# Patient Record
Sex: FEMALE | Race: WHITE | NOT HISPANIC OR LATINO | Employment: UNEMPLOYED | URBAN - METROPOLITAN AREA
[De-identification: names, ages, dates, MRNs, and addresses within clinical notes are randomized per-mention and may not be internally consistent; named-entity substitution may affect disease eponyms.]

---

## 2017-02-13 ENCOUNTER — TRANSCRIBE ORDERS (OUTPATIENT)
Dept: ADMINISTRATIVE | Facility: HOSPITAL | Age: 37
End: 2017-02-13

## 2017-02-13 ENCOUNTER — ALLSCRIPTS OFFICE VISIT (OUTPATIENT)
Dept: OTHER | Facility: OTHER | Age: 37
End: 2017-02-13

## 2017-02-13 DIAGNOSIS — I10 ESSENTIAL (PRIMARY) HYPERTENSION: ICD-10-CM

## 2017-02-13 DIAGNOSIS — R06.02 SHORTNESS OF BREATH: ICD-10-CM

## 2017-02-13 DIAGNOSIS — R06.02 SHORTNESS OF BREATH: Primary | ICD-10-CM

## 2017-02-13 DIAGNOSIS — R07.89 OTHER CHEST PAIN: ICD-10-CM

## 2017-02-27 ENCOUNTER — HOSPITAL ENCOUNTER (OUTPATIENT)
Dept: NON INVASIVE DIAGNOSTICS | Facility: CLINIC | Age: 37
Discharge: HOME | End: 2017-02-27
Payer: COMMERCIAL

## 2017-02-27 DIAGNOSIS — R06.02 SHORTNESS OF BREATH: ICD-10-CM

## 2017-02-27 DIAGNOSIS — R07.89 OTHER CHEST PAIN: ICD-10-CM

## 2017-02-27 DIAGNOSIS — I10 ESSENTIAL (PRIMARY) HYPERTENSION: ICD-10-CM

## 2017-02-27 LAB
CHEST PAIN STATEMENT: NORMAL
MAX DIASTOLIC BP: 92 MMHG
MAX HEART RATE: 169 BPM
MAX PREDICTED HEART RATE: 185 BPM
MAX. SYSTOLIC BP: 148 MMHG
PROTOCOL NAME: NORMAL
REASON FOR TERMINATION: NORMAL
TARGET HR FORMULA: NORMAL
TEST INDICATION: NORMAL
TIME IN EXERCISE PHASE: 586 S

## 2017-02-27 PROCEDURE — 93017 CV STRESS TEST TRACING ONLY: CPT

## 2017-02-27 PROCEDURE — 93306 TTE W/DOPPLER COMPLETE: CPT

## 2017-03-27 ENCOUNTER — ALLSCRIPTS OFFICE VISIT (OUTPATIENT)
Dept: OTHER | Facility: OTHER | Age: 37
End: 2017-03-27

## 2017-09-18 ENCOUNTER — ALLSCRIPTS OFFICE VISIT (OUTPATIENT)
Dept: OTHER | Facility: OTHER | Age: 37
End: 2017-09-18

## 2018-01-12 VITALS
SYSTOLIC BLOOD PRESSURE: 140 MMHG | HEART RATE: 72 BPM | HEIGHT: 62 IN | WEIGHT: 170.5 LBS | BODY MASS INDEX: 31.38 KG/M2 | DIASTOLIC BLOOD PRESSURE: 90 MMHG

## 2018-01-13 VITALS
BODY MASS INDEX: 23.3 KG/M2 | HEIGHT: 72 IN | HEART RATE: 60 BPM | DIASTOLIC BLOOD PRESSURE: 80 MMHG | SYSTOLIC BLOOD PRESSURE: 120 MMHG | WEIGHT: 172 LBS

## 2018-01-13 VITALS
SYSTOLIC BLOOD PRESSURE: 126 MMHG | BODY MASS INDEX: 31.47 KG/M2 | HEART RATE: 81 BPM | DIASTOLIC BLOOD PRESSURE: 80 MMHG | HEIGHT: 62 IN | WEIGHT: 171 LBS

## 2018-04-01 DIAGNOSIS — I10 ESSENTIAL HYPERTENSION: Primary | ICD-10-CM

## 2018-04-02 RX ORDER — LISINOPRIL 5 MG/1
TABLET ORAL
Qty: 30 TABLET | Refills: 5 | Status: SHIPPED | OUTPATIENT
Start: 2018-04-02 | End: 2018-09-26 | Stop reason: SDUPTHER

## 2018-09-26 DIAGNOSIS — I10 ESSENTIAL HYPERTENSION: ICD-10-CM

## 2018-09-26 RX ORDER — LISINOPRIL 5 MG/1
5 TABLET ORAL DAILY
Qty: 90 TABLET | Refills: 3 | Status: SHIPPED | OUTPATIENT
Start: 2018-09-26 | End: 2019-09-25 | Stop reason: DRUGHIGH

## 2019-09-25 ENCOUNTER — TELEPHONE (OUTPATIENT)
Dept: CARDIOLOGY CLINIC | Facility: CLINIC | Age: 39
End: 2019-09-25

## 2019-09-25 RX ORDER — LISINOPRIL 10 MG/1
10 TABLET ORAL DAILY
COMMUNITY
Start: 2019-09-25 | End: 2021-12-15

## 2019-09-25 NOTE — TELEPHONE ENCOUNTER
Pt advised  Offered appt with Ap for tomorrow  Pt refuse   She states she will call back to reschedule

## 2019-09-25 NOTE — TELEPHONE ENCOUNTER
Increase lisinopril to 10 mg daily  I am at [facility] tomorrow and completely booked, so if she wants she can see one of APs

## 2019-09-25 NOTE — TELEPHONE ENCOUNTER
Pt called stating her job is giving her a hard time  She is unable to leave to come to her appt on 9/27  Pt states she is available anytime before 11am tomorrow or she needs reschedule for a later appt but her BP is really elevated  Advised pt she really needs to get in  She denies chest pain, SOB, LH  She is currently being treated for bronchitis, prednisone and z-mariel  Pt states she willing to go to any office  I can see if one of the AP will see her if that ok with you?     Today 179/90  tues 1678/92  mon 153/94    lisinpril 5mg daily

## 2020-04-01 ENCOUNTER — OFFICE VISIT (OUTPATIENT)
Dept: FAMILY MEDICINE CLINIC | Facility: CLINIC | Age: 40
End: 2020-04-01
Payer: COMMERCIAL

## 2020-04-01 VITALS
WEIGHT: 175.2 LBS | RESPIRATION RATE: 16 BRPM | DIASTOLIC BLOOD PRESSURE: 72 MMHG | OXYGEN SATURATION: 98 % | BODY MASS INDEX: 33.08 KG/M2 | HEART RATE: 84 BPM | SYSTOLIC BLOOD PRESSURE: 120 MMHG | TEMPERATURE: 98.8 F | HEIGHT: 61 IN

## 2020-04-01 DIAGNOSIS — H60.322 ACUTE HEMORRHAGIC OTITIS EXTERNA OF LEFT EAR: Primary | ICD-10-CM

## 2020-04-01 PROCEDURE — 3078F DIAST BP <80 MM HG: CPT | Performed by: FAMILY MEDICINE

## 2020-04-01 PROCEDURE — 3008F BODY MASS INDEX DOCD: CPT | Performed by: FAMILY MEDICINE

## 2020-04-01 PROCEDURE — 1036F TOBACCO NON-USER: CPT | Performed by: FAMILY MEDICINE

## 2020-04-01 PROCEDURE — 3074F SYST BP LT 130 MM HG: CPT | Performed by: FAMILY MEDICINE

## 2020-04-01 PROCEDURE — 99213 OFFICE O/P EST LOW 20 MIN: CPT | Performed by: FAMILY MEDICINE

## 2020-04-05 ENCOUNTER — HOSPITAL ENCOUNTER (EMERGENCY)
Facility: HOSPITAL | Age: 40
Discharge: HOME | End: 2020-04-05
Attending: EMERGENCY MEDICINE | Admitting: EMERGENCY MEDICINE
Payer: COMMERCIAL

## 2020-04-05 VITALS
OXYGEN SATURATION: 96 % | DIASTOLIC BLOOD PRESSURE: 74 MMHG | HEART RATE: 96 BPM | BODY MASS INDEX: 33.49 KG/M2 | SYSTOLIC BLOOD PRESSURE: 158 MMHG | TEMPERATURE: 98 F | RESPIRATION RATE: 18 BRPM | WEIGHT: 177.25 LBS

## 2020-04-05 DIAGNOSIS — T23.292A SECOND DEGREE BURN OF MULTIPLE SITES OF LEFT HAND AND WRIST, INITIAL ENCOUNTER: Primary | ICD-10-CM

## 2020-04-05 PROCEDURE — 99284 EMERGENCY DEPT VISIT MOD MDM: CPT | Performed by: EMERGENCY MEDICINE

## 2020-04-05 PROCEDURE — 99283 EMERGENCY DEPT VISIT LOW MDM: CPT

## 2020-04-05 PROCEDURE — 90715 TDAP VACCINE 7 YRS/> IM: CPT | Performed by: EMERGENCY MEDICINE

## 2020-04-05 PROCEDURE — 90471 IMMUNIZATION ADMIN: CPT

## 2020-04-05 RX ORDER — GINSENG 100 MG
1 CAPSULE ORAL ONCE
Status: COMPLETED | OUTPATIENT
Start: 2020-04-05 | End: 2020-04-05

## 2020-04-05 RX ORDER — MORPHINE SULFATE 15 MG/1
7.5 TABLET ORAL EVERY 4 HOURS PRN
Qty: 11 TABLET | Refills: 0 | Status: SHIPPED | OUTPATIENT
Start: 2020-04-05 | End: 2020-04-15

## 2020-04-05 RX ORDER — ACETAMINOPHEN 500 MG
1000 TABLET ORAL EVERY 6 HOURS PRN
Qty: 30 TABLET | Refills: 0 | Status: SHIPPED | OUTPATIENT
Start: 2020-04-05 | End: 2021-12-16 | Stop reason: ALTCHOICE

## 2020-04-05 RX ORDER — OXYCODONE HYDROCHLORIDE 5 MG/1
5 TABLET ORAL ONCE
Status: COMPLETED | OUTPATIENT
Start: 2020-04-05 | End: 2020-04-05

## 2020-04-05 RX ADMIN — OXYCODONE HYDROCHLORIDE 5 MG: 5 TABLET ORAL at 14:54

## 2020-04-05 RX ADMIN — BACITRACIN ZINC 1 LARGE APPLICATION: 500 OINTMENT TOPICAL at 14:56

## 2020-04-05 RX ADMIN — TETANUS TOXOID, REDUCED DIPHTHERIA TOXOID AND ACELLULAR PERTUSSIS VACCINE, ADSORBED 0.5 ML: 5; 2.5; 8; 8; 2.5 SUSPENSION INTRAMUSCULAR at 14:55

## 2020-12-22 DIAGNOSIS — I10 ESSENTIAL (PRIMARY) HYPERTENSION: ICD-10-CM

## 2020-12-23 RX ORDER — LISINOPRIL 5 MG/1
TABLET ORAL
Qty: 21 TABLET | Refills: 8 | OUTPATIENT
Start: 2020-12-23

## 2021-01-08 DIAGNOSIS — I10 ESSENTIAL (PRIMARY) HYPERTENSION: Primary | ICD-10-CM

## 2021-01-08 RX ORDER — LISINOPRIL 5 MG/1
TABLET ORAL
Qty: 21 TABLET | Refills: 8 | Status: SHIPPED | OUTPATIENT
Start: 2021-01-08 | End: 2021-12-14 | Stop reason: SDUPTHER

## 2021-12-14 DIAGNOSIS — I10 ESSENTIAL (PRIMARY) HYPERTENSION: ICD-10-CM

## 2021-12-14 RX ORDER — LISINOPRIL 5 MG/1
5 TABLET ORAL DAILY
Qty: 7 TABLET | Refills: 0 | Status: SHIPPED | OUTPATIENT
Start: 2021-12-14 | End: 2021-12-16

## 2021-12-16 ENCOUNTER — OFFICE VISIT (OUTPATIENT)
Dept: FAMILY MEDICINE CLINIC | Facility: CLINIC | Age: 41
End: 2021-12-16
Payer: COMMERCIAL

## 2021-12-16 VITALS
SYSTOLIC BLOOD PRESSURE: 150 MMHG | WEIGHT: 150 LBS | HEIGHT: 62 IN | OXYGEN SATURATION: 98 % | BODY MASS INDEX: 27.6 KG/M2 | RESPIRATION RATE: 16 BRPM | DIASTOLIC BLOOD PRESSURE: 98 MMHG | HEART RATE: 88 BPM | TEMPERATURE: 99.3 F

## 2021-12-16 DIAGNOSIS — Z12.31 SCREENING MAMMOGRAM FOR HIGH-RISK PATIENT: ICD-10-CM

## 2021-12-16 DIAGNOSIS — Z11.59 NEED FOR HEPATITIS C SCREENING TEST: ICD-10-CM

## 2021-12-16 DIAGNOSIS — Z13.220 SCREENING CHOLESTEROL LEVEL: ICD-10-CM

## 2021-12-16 DIAGNOSIS — Z00.00 WELL ADULT EXAM: ICD-10-CM

## 2021-12-16 DIAGNOSIS — I10 ESSENTIAL (PRIMARY) HYPERTENSION: Primary | ICD-10-CM

## 2021-12-16 DIAGNOSIS — Z11.4 SCREENING FOR HIV (HUMAN IMMUNODEFICIENCY VIRUS): ICD-10-CM

## 2021-12-16 DIAGNOSIS — R21 MALAR RASH: ICD-10-CM

## 2021-12-16 PROCEDURE — 99396 PREV VISIT EST AGE 40-64: CPT | Performed by: FAMILY MEDICINE

## 2021-12-16 PROCEDURE — 3008F BODY MASS INDEX DOCD: CPT | Performed by: FAMILY MEDICINE

## 2021-12-16 PROCEDURE — 3725F SCREEN DEPRESSION PERFORMED: CPT | Performed by: FAMILY MEDICINE

## 2021-12-16 PROCEDURE — 1036F TOBACCO NON-USER: CPT | Performed by: FAMILY MEDICINE

## 2021-12-16 RX ORDER — LISINOPRIL 5 MG/1
5 TABLET ORAL DAILY
Qty: 30 TABLET | Refills: 1 | Status: SHIPPED | OUTPATIENT
Start: 2021-12-16 | End: 2021-12-16

## 2021-12-16 RX ORDER — LISINOPRIL 5 MG/1
10 TABLET ORAL DAILY
Qty: 30 TABLET | Refills: 1 | Status: SHIPPED | OUTPATIENT
Start: 2021-12-16 | End: 2022-01-17

## 2022-01-16 NOTE — PROGRESS NOTES
BMI Counseling: There is no height or weight on file to calculate BMI  The BMI is above normal  Nutrition recommendations include decreasing portion sizes, encouraging healthy choices of fruits and vegetables, decreasing fast food intake, consuming healthier snacks, limiting drinks that contain sugar, moderation in carbohydrate intake, increasing intake of lean protein, reducing intake of saturated and trans fat and reducing intake of cholesterol  Exercise recommendations include exercising 3-5 times per week  No pharmacotherapy was ordered  Patient referred to PCP  Rationale for BMI follow-up plan is due to patient being overweight or obese  Assessment/Plan:         Problem List Items Addressed This Visit        Cardiovascular and Mediastinum    Hypertension - Primary     Patient is stable with current anti-hypertensive medicine and continue to follow a low sodium diet and take current medication  All questions about this condition were answered today  Relevant Medications    lisinopril (ZESTRIL) 10 mg tablet      Other Visit Diagnoses     Malar rash        Relevant Orders    Ambulatory Referral to Rheumatology    Essential (primary) hypertension        Relevant Medications    lisinopril (ZESTRIL) 10 mg tablet            Subjective:      Patient ID: Jazmyne Givens is a 37 y o  female  A 72-year-old female here today for checkup on multiple medical problems patient has a hypertension is doing well with that  Patient on 10 mg and blood pressure stable  Patient episode of low pressure at 1 episode but she has only had that that were aware  Patient also has a malar rash and her lab work was reviewed which showed she was positive for lupus screen  Patient will see a rheumatologist for further evaluation of possible lupus        The following portions of the patient's history were reviewed and updated as appropriate:   Past Medical History:  She has a past medical history of Allergic, Anxiety, GERD (gastroesophageal reflux disease), Hypertension, Ovarian cyst, Sinus headache, and Sleep disorder  ,  _______________________________________________________________________  Medical Problems:  does not have any pertinent problems on file ,  _______________________________________________________________________  Past Surgical History:   has a past surgical history that includes [place] tooth extraction (2012); Ankle surgery (Right); Oophorectomy (Right); Tubal ligation; Mole removal; Ovarian cyst removal; and Wrist surgery (2013)  ,  _______________________________________________________________________  Family History:  family history includes No Known Problems in her father and mother ,  _______________________________________________________________________  Social History:   reports that she has never smoked  She has never used smokeless tobacco  She reports current alcohol use  She reports that she does not use drugs  ,  _______________________________________________________________________  Allergies:  is allergic to levofloxacin     _______________________________________________________________________  Current Outpatient Medications   Medication Sig Dispense Refill    lisinopril (ZESTRIL) 10 mg tablet Take 1 tablet (10 mg total) by mouth daily 90 tablet 1     No current facility-administered medications for this visit      _______________________________________________________________________  Review of Systems      Objective:  Vitals:    01/17/22 1654   BP: 130/70   BP Location: Left arm   Patient Position: Sitting   Cuff Size: Standard   Pulse: 97   Resp: 16   Temp: 99 3 °F (37 4 °C)   SpO2: 98%   Weight: 67 6 kg (149 lb)   Height: 5' 2" (1 575 m)     Body mass index is 27 25 kg/m²       Physical Exam

## 2022-01-17 ENCOUNTER — OFFICE VISIT (OUTPATIENT)
Dept: FAMILY MEDICINE CLINIC | Facility: CLINIC | Age: 42
End: 2022-01-17
Payer: COMMERCIAL

## 2022-01-17 VITALS
HEART RATE: 97 BPM | HEIGHT: 62 IN | RESPIRATION RATE: 16 BRPM | TEMPERATURE: 99.3 F | SYSTOLIC BLOOD PRESSURE: 130 MMHG | DIASTOLIC BLOOD PRESSURE: 70 MMHG | BODY MASS INDEX: 27.42 KG/M2 | WEIGHT: 149 LBS | OXYGEN SATURATION: 98 %

## 2022-01-17 DIAGNOSIS — I10 ESSENTIAL (PRIMARY) HYPERTENSION: ICD-10-CM

## 2022-01-17 DIAGNOSIS — I10 PRIMARY HYPERTENSION: Primary | ICD-10-CM

## 2022-01-17 DIAGNOSIS — R21 MALAR RASH: ICD-10-CM

## 2022-01-17 PROCEDURE — 3078F DIAST BP <80 MM HG: CPT | Performed by: FAMILY MEDICINE

## 2022-01-17 PROCEDURE — 1036F TOBACCO NON-USER: CPT | Performed by: FAMILY MEDICINE

## 2022-01-17 PROCEDURE — 3008F BODY MASS INDEX DOCD: CPT | Performed by: FAMILY MEDICINE

## 2022-01-17 PROCEDURE — 3075F SYST BP GE 130 - 139MM HG: CPT | Performed by: FAMILY MEDICINE

## 2022-01-17 PROCEDURE — 99213 OFFICE O/P EST LOW 20 MIN: CPT | Performed by: FAMILY MEDICINE

## 2022-01-17 RX ORDER — LISINOPRIL 10 MG/1
10 TABLET ORAL DAILY
Qty: 90 TABLET | Refills: 1 | Status: SHIPPED | OUTPATIENT
Start: 2022-01-17 | End: 2022-04-18 | Stop reason: SDUPTHER

## 2022-04-17 NOTE — PROGRESS NOTES
Depression Screening and Follow-up Plan: Patient was screened for depression during today's encounter  They screened negative with a PHQ-2 score of 0  Assessment/Plan:         Problem List Items Addressed This Visit        Cardiovascular and Mediastinum    Hypertension     Patient is stable with current anti-hypertensive medicine and continue to follow a low sodium diet and take current medication  All questions about this condition were answered today  Relevant Medications    lisinopril (ZESTRIL) 10 mg tablet    Other Relevant Orders    Basic metabolic panel      Other Visit Diagnoses     Encounter for screening mammogram for breast cancer    -  Primary    Relevant Orders    Mammo screening bilateral w 3d & cad    Essential (primary) hypertension        Relevant Medications    lisinopril (ZESTRIL) 10 mg tablet            Subjective:      Patient ID: Cristy Colbert is a 37 y o  female  A 27-year-old female here today for checkup on multiple medical problems  Patient with hypertension is doing very well with that  Patient also last time was seen her had a malar rash suggestive of lupus  Patient has seen Rheumatology who initially thought that she indeed had lupus however she went to a dermatologist for a 2nd opinion regards to possible rosacea which is with the dermatologist thought it was  Patient back to rheumatologist and had negative lab results for lupus and her rash is much better with the row fade  Will see patient back in approximately 6 months to reassess her blood pressure and will check her lab work then also  The following portions of the patient's history were reviewed and updated as appropriate:   Past Medical History:  She has a past medical history of Allergic, Anxiety, GERD (gastroesophageal reflux disease), Hypertension, Ovarian cyst, Sinus headache, and Sleep disorder  ,  _______________________________________________________________________  Medical Problems:  does not have any pertinent problems on file ,  _______________________________________________________________________  Past Surgical History:   has a past surgical history that includes [place] tooth extraction (2012); Ankle surgery (Right); Oophorectomy (Right); Tubal ligation; Mole removal; Ovarian cyst removal; and Wrist surgery (2013)  ,  _______________________________________________________________________  Family History:  family history includes No Known Problems in her father and mother ,  _______________________________________________________________________  Social History:   reports that she has never smoked  She has never used smokeless tobacco  She reports current alcohol use  She reports that she does not use drugs  ,  _______________________________________________________________________  Allergies:  is allergic to levofloxacin     _______________________________________________________________________  Current Outpatient Medications   Medication Sig Dispense Refill    lisinopril (ZESTRIL) 10 mg tablet Take 1 tablet (10 mg total) by mouth daily 90 tablet 1    Rhofade 1 % CREA apply to face once daily if needed with FLARES       No current facility-administered medications for this visit      _______________________________________________________________________  Review of Systems   Constitutional: Negative for activity change, appetite change, fatigue and fever  HENT: Negative for congestion, ear pain, postnasal drip, rhinorrhea, sinus pressure, sinus pain, sneezing and sore throat  Eyes: Negative for pain and redness  Respiratory: Negative for apnea, cough, chest tightness, shortness of breath and wheezing  Cardiovascular: Negative for chest pain, palpitations and leg swelling  Gastrointestinal: Negative for abdominal pain, constipation, diarrhea, nausea and vomiting  Endocrine: Negative for cold intolerance and heat intolerance     Genitourinary: Negative for difficulty urinating, dysuria, frequency, hematuria and urgency  Musculoskeletal: Negative for arthralgias, back pain, gait problem and myalgias  Skin: Positive for rash  Neurological: Negative for dizziness, speech difficulty, weakness, numbness and headaches  Hematological: Does not bruise/bleed easily  Psychiatric/Behavioral: Negative for agitation, confusion and hallucinations  Objective:  Vitals:    04/18/22 1329   BP: 126/74   BP Location: Left arm   Patient Position: Sitting   Cuff Size: Standard   Pulse: 71   Resp: 18   Temp: 98 2 °F (36 8 °C)   SpO2: 98%   Weight: 70 3 kg (155 lb)   Height: 5' 2" (1 575 m)     Body mass index is 28 35 kg/m²  Physical Exam  Vitals and nursing note reviewed  Constitutional:       Appearance: She is well-developed  HENT:      Head: Normocephalic and atraumatic  Nose: Nose normal       Mouth/Throat:      Mouth: Mucous membranes are moist    Eyes:      General: No scleral icterus  Conjunctiva/sclera: Conjunctivae normal       Pupils: Pupils are equal, round, and reactive to light  Neck:      Thyroid: No thyromegaly  Cardiovascular:      Rate and Rhythm: Normal rate and regular rhythm  Pulmonary:      Effort: Pulmonary effort is normal       Breath sounds: Normal breath sounds  No wheezing  Abdominal:      General: Bowel sounds are normal  There is no distension  Palpations: Abdomen is soft  Tenderness: There is no abdominal tenderness  There is no guarding or rebound  Musculoskeletal:         General: No tenderness or deformity  Normal range of motion  Cervical back: Normal range of motion and neck supple  Skin:     General: Skin is warm and dry  Findings: No erythema or rash  Neurological:      Mental Status: She is alert and oriented to person, place, and time  Sensory: No sensory deficit  Psychiatric:         Behavior: Behavior normal          Thought Content:  Thought content normal          Judgment: Judgment normal

## 2022-04-18 ENCOUNTER — OFFICE VISIT (OUTPATIENT)
Dept: FAMILY MEDICINE CLINIC | Facility: CLINIC | Age: 42
End: 2022-04-18
Payer: COMMERCIAL

## 2022-04-18 VITALS
SYSTOLIC BLOOD PRESSURE: 126 MMHG | BODY MASS INDEX: 28.52 KG/M2 | DIASTOLIC BLOOD PRESSURE: 74 MMHG | HEART RATE: 71 BPM | WEIGHT: 155 LBS | RESPIRATION RATE: 18 BRPM | TEMPERATURE: 98.2 F | HEIGHT: 62 IN | OXYGEN SATURATION: 98 %

## 2022-04-18 DIAGNOSIS — Z12.31 ENCOUNTER FOR SCREENING MAMMOGRAM FOR BREAST CANCER: Primary | ICD-10-CM

## 2022-04-18 DIAGNOSIS — I10 PRIMARY HYPERTENSION: ICD-10-CM

## 2022-04-18 DIAGNOSIS — I10 ESSENTIAL (PRIMARY) HYPERTENSION: ICD-10-CM

## 2022-04-18 PROCEDURE — 99214 OFFICE O/P EST MOD 30 MIN: CPT | Performed by: FAMILY MEDICINE

## 2022-04-18 PROCEDURE — 3074F SYST BP LT 130 MM HG: CPT | Performed by: FAMILY MEDICINE

## 2022-04-18 PROCEDURE — 1036F TOBACCO NON-USER: CPT | Performed by: FAMILY MEDICINE

## 2022-04-18 PROCEDURE — 3008F BODY MASS INDEX DOCD: CPT | Performed by: FAMILY MEDICINE

## 2022-04-18 PROCEDURE — 3725F SCREEN DEPRESSION PERFORMED: CPT | Performed by: FAMILY MEDICINE

## 2022-04-18 PROCEDURE — 3078F DIAST BP <80 MM HG: CPT | Performed by: FAMILY MEDICINE

## 2022-04-18 RX ORDER — OXYMETAZOLINE HYDROCHLORIDE 1 G/100G
CREAM TOPICAL
COMMUNITY
Start: 2022-03-31

## 2022-04-18 RX ORDER — LISINOPRIL 10 MG/1
10 TABLET ORAL DAILY
Qty: 90 TABLET | Refills: 1 | Status: SHIPPED | OUTPATIENT
Start: 2022-04-18

## 2022-10-24 ENCOUNTER — OFFICE VISIT (OUTPATIENT)
Dept: FAMILY MEDICINE CLINIC | Facility: CLINIC | Age: 42
End: 2022-10-24
Payer: COMMERCIAL

## 2022-10-24 VITALS
RESPIRATION RATE: 14 BRPM | OXYGEN SATURATION: 99 % | BODY MASS INDEX: 28.89 KG/M2 | SYSTOLIC BLOOD PRESSURE: 144 MMHG | WEIGHT: 157 LBS | DIASTOLIC BLOOD PRESSURE: 86 MMHG | HEART RATE: 70 BPM | TEMPERATURE: 98.2 F | HEIGHT: 62 IN

## 2022-10-24 DIAGNOSIS — L71.9 ROSACEA: ICD-10-CM

## 2022-10-24 DIAGNOSIS — Z13.220 SCREENING CHOLESTEROL LEVEL: ICD-10-CM

## 2022-10-24 DIAGNOSIS — Z12.4 SCREENING FOR CERVICAL CANCER: ICD-10-CM

## 2022-10-24 DIAGNOSIS — I10 ESSENTIAL (PRIMARY) HYPERTENSION: ICD-10-CM

## 2022-10-24 DIAGNOSIS — Z12.31 ENCOUNTER FOR SCREENING MAMMOGRAM FOR BREAST CANCER: ICD-10-CM

## 2022-10-24 DIAGNOSIS — I10 PRIMARY HYPERTENSION: Primary | ICD-10-CM

## 2022-10-24 PROCEDURE — 99213 OFFICE O/P EST LOW 20 MIN: CPT | Performed by: FAMILY MEDICINE

## 2022-10-24 RX ORDER — LISINOPRIL 10 MG/1
10 TABLET ORAL DAILY
Qty: 90 TABLET | Refills: 1 | Status: SHIPPED | OUTPATIENT
Start: 2022-10-24

## 2022-10-24 NOTE — PROGRESS NOTES
Name: Thai Borges      : 1980      MRN: [id number]  Encounter Provider: Blue Ng MD  Encounter Date: 10/24/2022   Encounter department: [address] [place] [address] Place     1  Primary hypertension  Assessment & Plan:  Patient is stable with current anti-hypertensive medicine and continue to follow a low sodium diet and take current medication  All questions about this condition were answered today  2  Screening for cervical cancer    3  Encounter for screening mammogram for breast cancer  -     Mammo screening bilateral w 3d & cad; Future; Expected date: 10/24/2022    4  Rosacea    5  Essential (primary) hypertension  -     lisinopril (ZESTRIL) 10 mg tablet; Take 1 tablet (10 mg total) by mouth daily  -     TSH + Free T4; Future  -     Comprehensive metabolic panel; Future  -     CBC and differential; Future  -     Magnesium; Future  -     Uric acid; Future  -     Urinalysis with microscopic    6  Screening cholesterol level  -     Lipid Panel with Direct LDL reflex; Future           Subjective     HPI  Review of Systems   Constitutional: Negative for activity change, appetite change, fatigue and fever  HENT: Negative for congestion, ear pain, postnasal drip, rhinorrhea, sinus pressure, sinus pain, sneezing and sore throat  Eyes: Negative for pain and redness  Respiratory: Negative for apnea, cough, chest tightness, shortness of breath and wheezing  Cardiovascular: Negative for chest pain, palpitations and leg swelling  Gastrointestinal: Negative for abdominal pain, constipation, diarrhea, nausea and vomiting  Endocrine: Negative for cold intolerance and heat intolerance  Genitourinary: Negative for difficulty urinating, dysuria, frequency, hematuria and urgency  Musculoskeletal: Negative for arthralgias, back pain, gait problem and myalgias  Skin: Negative for rash     Neurological: Negative for dizziness, speech difficulty, weakness, numbness and headaches  Hematological: Does not bruise/bleed easily  Psychiatric/Behavioral: Negative for agitation, confusion and hallucinations  Past Medical History:   Diagnosis Date   • Allergic    • Anxiety    • GERD (gastroesophageal reflux disease)    • Hypertension    • Ovarian cyst    • Sinus headache     probably from elevated BP   • Sleep disorder      Past Surgical History:   Procedure Laterality Date   • ANKLE SURGERY Right     ,    • MOLE REMOVAL      from neck; benign   • OOPHORECTOMY Right    • OVARIAN CYST REMOVAL     • TUBAL LIGATION     • WISDOM TOOTH EXTRACTION  2012    x2   • WRIST SURGERY  2013     Family History   Problem Relation Age of Onset   • No Known Problems Mother    • No Known Problems Father      Social History     Socioeconomic History   • Marital status: /Civil Union     Spouse name: None   • Number of children: None   • Years of education: None   • Highest education level: None   Occupational History   • Occupation: Homemaker   Tobacco Use   • Smoking status: Never Smoker   • Smokeless tobacco: Never Used   Vaping Use   • Vaping Use: Never used   Substance and Sexual Activity   • Alcohol use: Yes     Alcohol/week: 0 0 standard drinks     Comment: occasional   • Drug use: Never   • Sexual activity: None   Other Topics Concern   • None   Social History Narrative    · Most recent tobacco use screenin2020      · Do you currently or have you served in [employer]:   No      · Were you activated, into active duty, as a member of the [employer] or as a Reservist:   No      · Diet:   Regular      · Sexual orientation:   Heterosexual      · General stress level:   Medium     · Caffeine intake:   Heavy      · Seat belts used routinely:   Yes      · Smoke alarm in home:    Yes      · Sunscreen used routinely:   Yes      · Advance directive:   No      · Live alone or with others:   with others    · Are there stairs in your home:   No     Social Determinants of Health     Financial Resource Strain: Not on file   Food Insecurity: Not on file   Transportation Needs: Not on file   Physical Activity: Not on file   Stress: Not on file   Social Connections: Not on file   Intimate Partner Violence: Not on file   Housing Stability: Not on file     Current Outpatient Medications on File Prior to Visit   Medication Sig   • Rhofade 1 % CREA apply to face once daily if needed with FLARES   • [DISCONTINUED] lisinopril (ZESTRIL) 10 mg tablet Take 1 tablet (10 mg total) by mouth daily     Allergies   Allergen Reactions   • Levofloxacin      Reaction Date: 24Jun2009;      Immunization History   Administered Date(s) Administered   • COVID-19 PFIZER VACCINE 0 3 ML IM 10/10/2021, 10/31/2021   • Tdap 04/05/2020       Objective     /86 (BP Location: Left arm, Patient Position: Sitting, Cuff Size: Standard)   Pulse 70   Temp 98 2 °F (36 8 °C) (Temporal)   Resp 14   Ht 5' 2" (1 575 m)   Wt 71 2 kg (157 lb)   LMP 09/30/2022   SpO2 99%   BMI 28 72 kg/m²     Physical Exam  Vitals and nursing note reviewed  Constitutional:       Appearance: She is well-developed  HENT:      Head: Normocephalic and atraumatic  Nose: Nose normal       Mouth/Throat:      Mouth: Mucous membranes are moist    Eyes:      General: No scleral icterus  Conjunctiva/sclera: Conjunctivae normal       Pupils: Pupils are equal, round, and reactive to light  Neck:      Thyroid: No thyromegaly  Cardiovascular:      Rate and Rhythm: Normal rate and regular rhythm  Pulmonary:      Effort: Pulmonary effort is normal       Breath sounds: Normal breath sounds  No wheezing  Abdominal:      General: Bowel sounds are normal  There is no distension  Palpations: Abdomen is soft  Tenderness: There is no abdominal tenderness  There is no guarding or rebound  Musculoskeletal:         General: No tenderness or deformity  Normal range of motion        Cervical back: Normal range of motion and neck supple  Skin:     General: Skin is warm and dry  Findings: No erythema or rash  Neurological:      Mental Status: She is alert and oriented to person, place, and time  Sensory: No sensory deficit  Psychiatric:         Mood and Affect: Mood normal          Behavior: Behavior normal          Thought Content:  Thought content normal          Judgment: Judgment normal        Pawel Flores MD

## 2023-01-09 ENCOUNTER — HOSPITAL ENCOUNTER (OUTPATIENT)
Dept: RADIOLOGY | Facility: MEDICAL CENTER | Age: 43
Discharge: HOME | End: 2023-01-09

## 2023-01-09 VITALS — HEIGHT: 62 IN | BODY MASS INDEX: 28.89 KG/M2 | WEIGHT: 157 LBS

## 2023-01-09 DIAGNOSIS — Z12.31 ENCOUNTER FOR SCREENING MAMMOGRAM FOR BREAST CANCER: ICD-10-CM

## 2023-04-23 NOTE — PROGRESS NOTES
Name: Latha Campbell      : 1980      MRN: [id number]  Encounter Provider: Kayley Palacios MD  Encounter Date: 2023   Encounter department: [address] [place] [address] Place     1  Well adult exam    2  Rosacea  Assessment & Plan:  Patient is stable  and will continue present plan of care and reassess at next routine visit  All questions about this problem from patient were answered today  3  Primary hypertension  Assessment & Plan:  Patient is stable with current anti-hypertensive medicine and continue to follow a low sodium diet and take current medication  All questions about this condition were answered today  Orders:  -     Comprehensive metabolic panel; Future  -     CBC and differential; Future  -     Magnesium; Future  -     Uric acid; Future  -     Urinalysis with microscopic  -     TSH, 3rd generation with Free T4 reflex; Future    4  Screening cholesterol level  -     Lipid Panel with Direct LDL reflex; Future    5  Seasonal allergic rhinitis due to pollen  -     olopatadine (PATANOL) 0 1 % ophthalmic solution; Administer 1 drop to both eyes 2 (two) times a day      BMI Counseling: There is no height or weight on file to calculate BMI  The BMI is above normal  Nutrition recommendations include decreasing portion sizes, encouraging healthy choices of fruits and vegetables, decreasing fast food intake, consuming healthier snacks, limiting drinks that contain sugar, moderation in carbohydrate intake, increasing intake of lean protein, reducing intake of saturated and trans fat and reducing intake of cholesterol  Exercise recommendations include exercising 3-5 times per week  No pharmacotherapy was ordered  Patient referred to PCP  Rationale for BMI follow-up plan is due to patient being overweight or obese  Depression Screening and Follow-up Plan: Patient was screened for depression during today's encounter   They screened negative with a PHQ-2 score of 0         Subjective     45-year-old female here for yearly checkup as well as checkup on her hypertension and rosacea  Patient doing very well with her blood pressure and with her rosacea she uses Rhofade and she is also taking her antidepressant antihypertensive  Patient is refills on her blood pressure medicine I will do that for today we will also do her yearly lab work which she has been due for since 2021  Patient does not smoke and is has had her mammogram done  Review of Systems   Constitutional: Negative for activity change, appetite change, fatigue and fever  HENT: Negative for congestion, ear pain, postnasal drip, rhinorrhea, sinus pressure, sinus pain, sneezing and sore throat  Eyes: Negative for pain and redness  Respiratory: Negative for apnea, cough, chest tightness, shortness of breath and wheezing  Cardiovascular: Negative for chest pain, palpitations and leg swelling  Gastrointestinal: Negative for abdominal pain, constipation, diarrhea, nausea and vomiting  Endocrine: Negative for cold intolerance and heat intolerance  Genitourinary: Negative for difficulty urinating, dysuria, frequency, hematuria and urgency  Musculoskeletal: Negative for arthralgias, back pain, gait problem and myalgias  Skin: Negative for rash  Neurological: Negative for dizziness, speech difficulty, weakness, numbness and headaches  Hematological: Does not bruise/bleed easily  Psychiatric/Behavioral: Negative for agitation, confusion and hallucinations         Past Medical History:   Diagnosis Date   • Allergic    • Anxiety    • GERD (gastroesophageal reflux disease)    • Hypertension    • Ovarian cyst    • Sinus headache     probably from elevated BP   • Sleep disorder      Past Surgical History:   Procedure Laterality Date   • ANKLE SURGERY Right     1998, 1999   • MOLE REMOVAL      from neck; benign   • OOPHORECTOMY Right    • OVARIAN CYST REMOVAL     • TUBAL LIGATION     • WISDOM TOOTH EXTRACTION  2012    x2   • WRIST SURGERY  2013     Family History   Problem Relation Age of Onset   • No Known Problems Mother    • Skin cancer Father    • No Known Problems Daughter    • No Known Problems Maternal Grandmother    • No Known Problems Maternal Grandfather    • No Known Problems Paternal Grandmother    • No Known Problems Paternal Grandfather    • Breast cancer Maternal Aunt 48   • No Known Problems Brother    • No Known Problems Brother    • No Known Problems Son    • No Known Problems Maternal Uncle    • No Known Problems Maternal Uncle    • No Known Problems Maternal Uncle    • No Known Problems Maternal Uncle    • No Known Problems Paternal Aunt    • No Known Problems Paternal Aunt    • No Known Problems Paternal Uncle      Social History     Socioeconomic History   • Marital status: /Civil Union     Spouse name: None   • Number of children: None   • Years of education: None   • Highest education level: None   Occupational History   • Occupation: Homemaker   Tobacco Use   • Smoking status: Never   • Smokeless tobacco: Never   Vaping Use   • Vaping Use: Never used   Substance and Sexual Activity   • Alcohol use: Yes     Alcohol/week: 0 0 standard drinks     Comment: occasional   • Drug use: Never   • Sexual activity: None   Other Topics Concern   • None   Social History Narrative    · Most recent tobacco use screenin2020      · Do you currently or have you served in the [employer]:   No      · Were you activated, into active duty, as a member of the [email or web address] or as a Reservist:   No      · Diet:   Regular      · Sexual orientation:   Heterosexual      · General stress level:   Medium     · Caffeine intake:   Heavy      · Seat belts used routinely:   Yes      · Smoke alarm in home:    Yes      · Sunscreen used routinely:   Yes      · Advance directive:   No      · Live alone or with others:   with others    · Are there stairs in your home:   No     Social Determinants of "Health     Financial Resource Strain: Not on file   Food Insecurity: Not on file   Transportation Needs: Not on file   Physical Activity: Not on file   Stress: Not on file   Social Connections: Not on file   Intimate Partner Violence: Not on file   Housing Stability: Not on file     Current Outpatient Medications on File Prior to Visit   Medication Sig   • lisinopril (ZESTRIL) 10 mg tablet Take 1 tablet (10 mg total) by mouth daily   • Rhofade 1 % CREA apply to face once daily if needed with FLARES     Allergies   Allergen Reactions   • Levofloxacin      Reaction Date: 24Jun2009;      Immunization History   Administered Date(s) Administered   • COVID-19 PFIZER VACCINE 0 3 ML IM 10/10/2021, 10/31/2021   • Tdap 04/05/2020       Objective     /80 (BP Location: Left arm, Patient Position: Sitting, Cuff Size: Standard)   Pulse 90   Temp 98 2 °F (36 8 °C) (Temporal)   Resp 18   Ht 5' 2\" (1 575 m)   Wt 75 3 kg (166 lb)   LMP 04/15/2023 (Approximate)   SpO2 99%   BMI 30 36 kg/m²     Physical Exam  Vitals and nursing note reviewed  Constitutional:       Appearance: She is well-developed  HENT:      Head: Normocephalic and atraumatic  Nose: Nose normal       Mouth/Throat:      Mouth: Mucous membranes are moist    Eyes:      General: No scleral icterus  Conjunctiva/sclera: Conjunctivae normal       Pupils: Pupils are equal, round, and reactive to light  Neck:      Thyroid: No thyromegaly  Cardiovascular:      Rate and Rhythm: Normal rate and regular rhythm  Pulmonary:      Effort: Pulmonary effort is normal       Breath sounds: Normal breath sounds  No wheezing  Abdominal:      General: Bowel sounds are normal  There is no distension  Palpations: Abdomen is soft  Tenderness: There is no abdominal tenderness  There is no guarding or rebound  Musculoskeletal:         General: No tenderness or deformity  Normal range of motion        Cervical back: Normal range of motion and neck " supple  Skin:     General: Skin is warm and dry  Findings: No erythema or rash  Neurological:      Mental Status: She is alert and oriented to person, place, and time  Sensory: No sensory deficit  Psychiatric:         Behavior: Behavior normal          Thought Content:  Thought content normal          Judgment: Judgment normal        Yanick Wright MD

## 2023-04-24 ENCOUNTER — OFFICE VISIT (OUTPATIENT)
Dept: FAMILY MEDICINE CLINIC | Facility: CLINIC | Age: 43
End: 2023-04-24

## 2023-04-24 VITALS
DIASTOLIC BLOOD PRESSURE: 80 MMHG | RESPIRATION RATE: 18 BRPM | OXYGEN SATURATION: 99 % | SYSTOLIC BLOOD PRESSURE: 124 MMHG | BODY MASS INDEX: 30.55 KG/M2 | HEART RATE: 90 BPM | WEIGHT: 166 LBS | TEMPERATURE: 98.2 F | HEIGHT: 62 IN

## 2023-04-24 DIAGNOSIS — J30.1 SEASONAL ALLERGIC RHINITIS DUE TO POLLEN: ICD-10-CM

## 2023-04-24 DIAGNOSIS — Z00.00 WELL ADULT EXAM: Primary | ICD-10-CM

## 2023-04-24 DIAGNOSIS — L71.9 ROSACEA: ICD-10-CM

## 2023-04-24 DIAGNOSIS — I10 PRIMARY HYPERTENSION: ICD-10-CM

## 2023-04-24 DIAGNOSIS — Z13.220 SCREENING CHOLESTEROL LEVEL: ICD-10-CM

## 2023-04-24 RX ORDER — OLOPATADINE HYDROCHLORIDE 1 MG/ML
1 SOLUTION/ DROPS OPHTHALMIC 2 TIMES DAILY
Qty: 5 ML | Refills: 2 | Status: SHIPPED | OUTPATIENT
Start: 2023-04-24

## 2023-04-28 DIAGNOSIS — I10 ESSENTIAL (PRIMARY) HYPERTENSION: ICD-10-CM

## 2023-04-28 RX ORDER — LISINOPRIL 10 MG/1
TABLET ORAL
Qty: 90 TABLET | Refills: 1 | Status: SHIPPED | OUTPATIENT
Start: 2023-04-28

## 2023-10-25 DIAGNOSIS — I10 ESSENTIAL (PRIMARY) HYPERTENSION: Primary | ICD-10-CM

## 2023-10-25 DIAGNOSIS — Z13.220 SCREENING CHOLESTEROL LEVEL: ICD-10-CM

## 2023-10-25 DIAGNOSIS — I10 ESSENTIAL (PRIMARY) HYPERTENSION: ICD-10-CM

## 2023-10-25 RX ORDER — LISINOPRIL 10 MG/1
TABLET ORAL
Qty: 90 TABLET | Refills: 1 | Status: SHIPPED | OUTPATIENT
Start: 2023-10-25 | End: 2023-10-26 | Stop reason: SDUPTHER

## 2023-10-26 ENCOUNTER — OFFICE VISIT (OUTPATIENT)
Dept: FAMILY MEDICINE CLINIC | Facility: CLINIC | Age: 43
End: 2023-10-26
Payer: COMMERCIAL

## 2023-10-26 VITALS
HEIGHT: 62 IN | SYSTOLIC BLOOD PRESSURE: 120 MMHG | TEMPERATURE: 98.1 F | BODY MASS INDEX: 28.16 KG/M2 | DIASTOLIC BLOOD PRESSURE: 70 MMHG | RESPIRATION RATE: 16 BRPM | HEART RATE: 72 BPM | OXYGEN SATURATION: 97 % | WEIGHT: 153 LBS

## 2023-10-26 DIAGNOSIS — I10 ESSENTIAL (PRIMARY) HYPERTENSION: ICD-10-CM

## 2023-10-26 DIAGNOSIS — I10 PRIMARY HYPERTENSION: Primary | ICD-10-CM

## 2023-10-26 DIAGNOSIS — L71.9 ROSACEA: ICD-10-CM

## 2023-10-26 DIAGNOSIS — M75.51 BURSITIS OF RIGHT SHOULDER: ICD-10-CM

## 2023-10-26 PROCEDURE — 99214 OFFICE O/P EST MOD 30 MIN: CPT | Performed by: FAMILY MEDICINE

## 2023-10-26 RX ORDER — NAPROXEN 500 MG/1
500 TABLET ORAL 2 TIMES DAILY WITH MEALS
Qty: 20 TABLET | Refills: 1 | Status: SHIPPED | OUTPATIENT
Start: 2023-10-26

## 2023-10-26 RX ORDER — LISINOPRIL 10 MG/1
10 TABLET ORAL DAILY
Qty: 90 TABLET | Refills: 1 | Status: SHIPPED | OUTPATIENT
Start: 2023-10-26

## 2023-10-26 NOTE — PROGRESS NOTES
Name: Librado Ortiz      : 1980      MRN: [id number]  Encounter Provider: Vicki Noonan MD  Encounter Date: 10/26/2023   Encounter department: [facility] [address]     1. Primary hypertension  Assessment & Plan:  Patient is stable with current anti-hypertensive medicine and continue to follow a low sodium diet and take current medication. All questions about this condition were answered today. 2. Rosacea  Assessment & Plan:  Patient is stable  and will continue present plan of care and reassess at next routine visit. All questions about this problem from patient were answered today. 3. Essential (primary) hypertension  -     lisinopril (ZESTRIL) 10 mg tablet; Take 1 tablet (10 mg total) by mouth daily    4. Bursitis of right shoulder  -     naproxen (Naprosyn) 500 mg tablet; Take 1 tablet (500 mg total) by mouth 2 (two) times a day with meals  -     XR shoulder 2+ vw right; Future; Expected date: 10/26/2023  -     Ambulatory Referral to Physical Therapy; Future        Depression Screening and Follow-up Plan: Patient was screened for depression during today's encounter. They screened negative with a PHQ-2 score of 0. Subjective     56-year-old female today for checkup on multimedical problems patient was summarization hypertension and is doing well with both those problems. Patient does not need refills and will call when appropriate to refill. Patient also has an acute problem of her right shoulder she has had this for for about 1 month. Patient has pain in her anterior shoulder as well as in her posterior shoulder and she has problem abducting to 90 degrees as well as anterior rotation and lifting her arm she does have pain in her anterior shoulder when I put pressure on her wrist and going a downward motion.   Patient has a classic bursitis in her shoulder I will get her some anti-inflammatories we will do an x-ray of her shoulder as well get some physical therapy. Review of Systems    Past Medical History:   Diagnosis Date   • Allergic    • Anxiety    • GERD (gastroesophageal reflux disease)    • Hypertension    • Ovarian cyst    • Sinus headache     probably from elevated BP   • Sleep disorder      Past Surgical History:   Procedure Laterality Date   • ANKLE SURGERY Right     ,    • MOLE REMOVAL      from neck; benign   • OOPHORECTOMY Right    • OVARIAN CYST REMOVAL     • TUBAL LIGATION     • WISDOM TOOTH EXTRACTION  2012    x2   • WRIST SURGERY  2013     Family History   Problem Relation Age of Onset   • No Known Problems Mother    • Skin cancer Father    • No Known Problems Daughter    • No Known Problems Maternal Grandmother    • No Known Problems Maternal Grandfather    • No Known Problems Paternal Grandmother    • No Known Problems Paternal Grandfather    • Breast cancer Maternal Aunt 48   • No Known Problems Brother    • No Known Problems Brother    • No Known Problems Son    • No Known Problems Maternal Uncle    • No Known Problems Maternal Uncle    • No Known Problems Maternal Uncle    • No Known Problems Maternal Uncle    • No Known Problems Paternal Aunt    • No Known Problems Paternal Aunt    • No Known Problems Paternal Uncle      Social History     Socioeconomic History   • Marital status: /Civil Union     Spouse name: None   • Number of children: None   • Years of education: None   • Highest education level: None   Occupational History   • Occupation: Homemaker   Tobacco Use   • Smoking status: Never   • Smokeless tobacco: Never   Vaping Use   • Vaping Use: Never used   Substance and Sexual Activity   • Alcohol use:  Yes     Alcohol/week: 0.0 standard drinks of alcohol     Comment: occasional   • Drug use: Never   • Sexual activity: None   Other Topics Concern   • None   Social History Narrative    · Most recent tobacco use screenin2020      · Do you currently or have you served in the [address] [place] [address] [employer]:   No · Were you activated, into active duty, as a member of the [employer] or as a Reservist:   No      · Diet:   Regular      · Sexual orientation:   Heterosexual      · General stress level:   Medium     · Caffeine intake:   Heavy      · Seat belts used routinely:   Yes      · Smoke alarm in home:    Yes      · Sunscreen used routinely:   Yes      · Advance directive:   No      · Live alone or with others:   with others    · Are there stairs in your home:   No     Social Determinants of Health     Financial Resource Strain: Not on file   Food Insecurity: Not on file   Transportation Needs: Not on file   Physical Activity: Not on file   Stress: Not on file   Social Connections: Not on file   Intimate Partner Violence: Not on file   Housing Stability: Not on file     Current Outpatient Medications on File Prior to Visit   Medication Sig   • olopatadine (PATANOL) 0.1 % ophthalmic solution Administer 1 drop to both eyes 2 (two) times a day   • Rhofade 1 % CREA apply to face once daily if needed with FLARES   • [DISCONTINUED] lisinopril (ZESTRIL) 10 mg tablet take 1 tablet by mouth once daily     Allergies   Allergen Reactions   • Levofloxacin      Reaction Date: 24Jun2009;      Immunization History   Administered Date(s) Administered   • COVID-19 PFIZER VACCINE 0.3 ML IM 10/10/2021, 10/31/2021   • Tdap 04/05/2020       Objective     /70 (BP Location: Left arm, Patient Position: Sitting, Cuff Size: Standard)   Pulse 72   Temp 98.1 °F (36.7 °C)   Resp 16   Ht 5' 2" (1.575 m)   Wt 69.4 kg (153 lb)   SpO2 97%   BMI 27.98 kg/m²     Physical Exam  Conor Amos MD

## 2023-10-28 ENCOUNTER — APPOINTMENT (OUTPATIENT)
Dept: RADIOLOGY | Facility: MEDICAL CENTER | Age: 43
End: 2023-10-28
Payer: COMMERCIAL

## 2023-10-28 DIAGNOSIS — M75.51 BURSITIS OF RIGHT SHOULDER: ICD-10-CM

## 2023-10-28 PROCEDURE — 73030 X-RAY EXAM OF SHOULDER: CPT

## 2024-02-06 DIAGNOSIS — L71.9 ROSACEA: Primary | ICD-10-CM

## 2024-02-06 RX ORDER — OXYMETAZOLINE HYDROCHLORIDE 1 G/100G
CREAM TOPICAL DAILY PRN
Qty: 30 G | Refills: 3 | Status: SHIPPED | OUTPATIENT
Start: 2024-02-06

## 2024-02-21 PROBLEM — H60.322 ACUTE HEMORRHAGIC OTITIS EXTERNA OF LEFT EAR: Status: RESOLVED | Noted: 2020-04-01 | Resolved: 2024-02-21

## 2024-03-22 DIAGNOSIS — I10 ESSENTIAL (PRIMARY) HYPERTENSION: ICD-10-CM

## 2024-03-22 RX ORDER — LISINOPRIL 10 MG/1
10 TABLET ORAL DAILY
Qty: 90 TABLET | Refills: 1 | Status: SHIPPED | OUTPATIENT
Start: 2024-03-22

## 2024-04-29 ENCOUNTER — OFFICE VISIT (OUTPATIENT)
Dept: FAMILY MEDICINE CLINIC | Facility: CLINIC | Age: 44
End: 2024-04-29
Payer: COMMERCIAL

## 2024-04-29 VITALS
DIASTOLIC BLOOD PRESSURE: 68 MMHG | RESPIRATION RATE: 18 BRPM | WEIGHT: 159 LBS | SYSTOLIC BLOOD PRESSURE: 126 MMHG | BODY MASS INDEX: 29.26 KG/M2 | TEMPERATURE: 98 F | HEART RATE: 18 BPM | HEIGHT: 62 IN | OXYGEN SATURATION: 98 %

## 2024-04-29 DIAGNOSIS — I10 PRIMARY HYPERTENSION: ICD-10-CM

## 2024-04-29 DIAGNOSIS — Z00.00 WELL ADULT EXAM: Primary | ICD-10-CM

## 2024-04-29 DIAGNOSIS — L71.9 ROSACEA: ICD-10-CM

## 2024-04-29 DIAGNOSIS — Z13.220 SCREENING CHOLESTEROL LEVEL: ICD-10-CM

## 2024-04-29 PROCEDURE — 99396 PREV VISIT EST AGE 40-64: CPT | Performed by: FAMILY MEDICINE

## 2024-04-29 PROCEDURE — 3725F SCREEN DEPRESSION PERFORMED: CPT | Performed by: FAMILY MEDICINE

## 2024-04-29 PROCEDURE — 3074F SYST BP LT 130 MM HG: CPT | Performed by: FAMILY MEDICINE

## 2024-04-29 PROCEDURE — 3078F DIAST BP <80 MM HG: CPT | Performed by: FAMILY MEDICINE

## 2024-04-29 NOTE — PROGRESS NOTES
Name: Lynda Shaffer      : 1980      MRN: [id number]  Encounter Provider: Jean Joyce MD  Encounter Date: 2024   Encounter department: [facility]    Assessment & Plan     1. Well adult exam    2. Primary hypertension  Assessment & Plan:  Patient is stable with current anti-hypertensive medicine and continue to follow a low sodium diet and take current medication. All questions about this condition were answered today.     Orders:  -     Comprehensive metabolic panel; Future  -     CBC and differential; Future  -     TSH, 3rd generation with Free T4 reflex; Future  -     Magnesium; Future  -     Uric acid; Future  -     UA/M w/rflx Culture, Comp    3. Rosacea  Assessment & Plan:  Patient is stable  and will continue present plan of care and reassess at next routine visit. All questions about this problem from patient were answered today.       4. Screening cholesterol level  -     Lipid Panel with Direct LDL reflex; Future           Subjective     43-year-old female today for checkup for annual as well as recheck on her blood pressure and rosacea.  Patient is doing very well with her medication she does not need any refills and she is doing really well with her rosacea.  Patient takes her Rhofade and it is probably noticeable with her facial rosacea.      Review of Systems   Constitutional:  Negative for activity change, appetite change, fatigue and fever.   HENT:  Negative for congestion, ear pain, postnasal drip, rhinorrhea, sinus pressure, sinus pain, sneezing and sore throat.    Eyes:  Negative for pain and redness.   Respiratory:  Negative for apnea, cough, chest tightness, shortness of breath and wheezing.    Cardiovascular:  Negative for chest pain, palpitations and leg swelling.   Gastrointestinal:  Negative for abdominal pain, constipation, diarrhea, nausea and vomiting.   Endocrine: Negative for cold intolerance and heat intolerance.   Genitourinary:  Negative  for difficulty urinating, dysuria, frequency, hematuria and urgency.   Musculoskeletal:  Negative for arthralgias, back pain, gait problem and myalgias.   Skin:  Negative for rash.   Neurological:  Negative for dizziness, speech difficulty, weakness, numbness and headaches.   Hematological:  Does not bruise/bleed easily.   Psychiatric/Behavioral:  Negative for agitation, confusion and hallucinations.        Past Medical History:   Diagnosis Date   • Allergic    • Anxiety    • GERD (gastroesophageal reflux disease)    • Hypertension    • Ovarian cyst    • Sinus headache     probably from elevated BP   • Sleep disorder      Past Surgical History:   Procedure Laterality Date   • ANKLE SURGERY Right     1998, 1999   • MOLE REMOVAL      from neck; benign   • OOPHORECTOMY Right    • OVARIAN CYST REMOVAL     • TUBAL LIGATION     • WISDOM TOOTH EXTRACTION  2012    x2   • WRIST SURGERY  2013     Family History   Problem Relation Age of Onset   • No Known Problems Mother    • Skin cancer Father    • No Known Problems Daughter    • No Known Problems Maternal Grandmother    • No Known Problems Maternal Grandfather    • No Known Problems Paternal Grandmother    • No Known Problems Paternal Grandfather    • Breast cancer Maternal Aunt 50   • No Known Problems Brother    • No Known Problems Brother    • No Known Problems Son    • No Known Problems Maternal Uncle    • No Known Problems Maternal Uncle    • No Known Problems Maternal Uncle    • No Known Problems Maternal Uncle    • No Known Problems Paternal Aunt    • No Known Problems Paternal Aunt    • No Known Problems Paternal Uncle      Social History     Socioeconomic History   • Marital status: /Civil Union     Spouse name: None   • Number of children: None   • Years of education: None   • Highest education level: None   Occupational History   • Occupation: Homemaker   Tobacco Use   • Smoking status: Never     Passive exposure: Never   • Smokeless tobacco: Never    Vaping Use   • Vaping status: Never Used   Substance and Sexual Activity   • Alcohol use: Yes     Alcohol/week: 0.0 standard drinks of alcohol     Comment: occasional   • Drug use: Never   • Sexual activity: None   Other Topics Concern   • None   Social History Narrative    · Most recent tobacco use screenin2020      · Do you currently or have you served in the [employer]:   No      · Were you activated, into active duty, as a member of the [employer] or as a Reservist:   No      · Diet:   Regular      · Sexual orientation:   Heterosexual      · General stress level:   Medium     · Caffeine intake:   Heavy      · Seat belts used routinely:   Yes      · Smoke alarm in home:   Yes      · Sunscreen used routinely:   Yes      · Advance directive:   No      · Live alone or with others:   with others    · Are there stairs in your home:   No     Social Determinants of Health     Financial Resource Strain: Not on file   Food Insecurity: Not on file   Transportation Needs: Not on file   Physical Activity: Not on file   Stress: Not on file   Social Connections: Not on file   Intimate Partner Violence: Not on file   Housing Stability: Not on file     Current Outpatient Medications on File Prior to Visit   Medication Sig   • lisinopril (ZESTRIL) 10 mg tablet Take 1 tablet (10 mg total) by mouth daily   • olopatadine (PATANOL) 0.1 % ophthalmic solution Administer 1 drop to both eyes 2 (two) times a day   • Rhofade 1 % CREA Apply topically daily as needed (with FLARES)   • naproxen (Naprosyn) 500 mg tablet Take 1 tablet (500 mg total) by mouth 2 (two) times a day with meals (Patient not taking: Reported on 2024)     Allergies   Allergen Reactions   • Levofloxacin      Reaction Date: 2009;      Immunization History   Administered Date(s) Administered   • COVID-19 PFIZER VACCINE 0.3 ML IM 10/10/2021, 10/31/2021   • Tdap 2020       Objective     /68 (BP Location: Left arm, Patient  "Position: Sitting, Cuff Size: Standard)   Pulse (!) 18   Temp 98 °F (36.7 °C)   Resp 18   Ht 5' 2\" (1.575 m)   Wt 72.1 kg (159 lb)   SpO2 98%   BMI 29.08 kg/m²     Physical Exam  Vitals and nursing note reviewed.   Constitutional:       Appearance: She is well-developed.   HENT:      Head: Normocephalic and atraumatic.      Nose: Nose normal.      Mouth/Throat:      Mouth: Mucous membranes are moist.   Eyes:      General: No scleral icterus.     Conjunctiva/sclera: Conjunctivae normal.      Pupils: Pupils are equal, round, and reactive to light.   Neck:      Thyroid: No thyromegaly.   Cardiovascular:      Rate and Rhythm: Normal rate and regular rhythm.   Pulmonary:      Effort: Pulmonary effort is normal.      Breath sounds: Normal breath sounds. No wheezing.   Abdominal:      General: Bowel sounds are normal. There is no distension.      Palpations: Abdomen is soft.      Tenderness: There is no abdominal tenderness. There is no guarding or rebound.   Musculoskeletal:         General: No tenderness or deformity. Normal range of motion.      Cervical back: Normal range of motion and neck supple.   Skin:     General: Skin is warm and dry.      Findings: No erythema or rash.   Neurological:      Mental Status: She is alert and oriented to person, place, and time.      Sensory: No sensory deficit.   Psychiatric:         Mood and Affect: Mood normal.         Behavior: Behavior normal.         Thought Content: Thought content normal.         Judgment: Judgment normal.       Jean Joyce MD    "

## 2024-06-14 ENCOUNTER — HOSPITAL ENCOUNTER (OUTPATIENT)
Dept: RADIOLOGY | Facility: MEDICAL CENTER | Age: 44
Discharge: HOME | End: 2024-06-14
Payer: COMMERCIAL

## 2024-06-14 VITALS — WEIGHT: 159 LBS | HEIGHT: 62 IN | BODY MASS INDEX: 29.26 KG/M2

## 2024-06-14 DIAGNOSIS — Z12.31 SCREENING MAMMOGRAM FOR HIGH-RISK PATIENT: ICD-10-CM

## 2024-06-14 DIAGNOSIS — Z12.31 ENCOUNTER FOR SCREENING MAMMOGRAM FOR MALIGNANT NEOPLASM OF BREAST: ICD-10-CM

## 2024-06-14 PROCEDURE — 77067 SCR MAMMO BI INCL CAD: CPT

## 2024-06-14 PROCEDURE — 77063 BREAST TOMOSYNTHESIS BI: CPT

## 2024-08-11 DIAGNOSIS — I10 ESSENTIAL (PRIMARY) HYPERTENSION: ICD-10-CM

## 2024-08-11 DIAGNOSIS — J30.1 SEASONAL ALLERGIC RHINITIS DUE TO POLLEN: ICD-10-CM

## 2024-08-11 RX ORDER — LISINOPRIL 10 MG/1
10 TABLET ORAL DAILY
Qty: 30 TABLET | Refills: 0 | Status: SHIPPED | OUTPATIENT
Start: 2024-08-11

## 2024-08-11 RX ORDER — OLOPATADINE HYDROCHLORIDE 1 MG/ML
SOLUTION/ DROPS OPHTHALMIC
Qty: 5 ML | Refills: 0 | Status: SHIPPED | OUTPATIENT
Start: 2024-08-11

## 2024-09-12 DIAGNOSIS — I10 ESSENTIAL (PRIMARY) HYPERTENSION: ICD-10-CM

## 2024-09-12 RX ORDER — LISINOPRIL 10 MG/1
10 TABLET ORAL DAILY
Qty: 30 TABLET | Refills: 0 | Status: SHIPPED | OUTPATIENT
Start: 2024-09-12

## 2024-10-27 NOTE — PROGRESS NOTES
Ambulatory Visit  Name: Lynda Shaffer      : 1980      MRN: [id number]  Encounter Provider: Jean Joyce MD  Encounter Date: 10/28/2024   Encounter department: [facility]    Assessment & Plan  Well adult exam         Primary hypertension  Patient is stable with current anti-hypertensive medicine and continue to follow a low sodium diet and take current medication. All questions about this condition were answered today.          Rosacea  Patient is stable  and will continue present plan of care and reassess at next routine visit. All questions about this problem from patient were answered today.          Encounter for immunization    Orders:    influenza vaccine preservative-free 0.5 mL IM (Fluzone, Afluria, Fluarix, Flulaval)    influenza vaccine preservative-free 0.5 mL IM (Fluzone, Afluria, Fluarix, Flulaval)    CSF leak from nose    Orders:    Ambulatory Referral to Otolaryngology; Future    CT head wo contrast; Future    Essential (primary) hypertension    Orders:    lisinopril (ZESTRIL) 20 mg tablet; Take 1 tablet (20 mg total) by mouth daily       History of Present Illness     44-year-old lady here today for yearly physical exam here for recheck on blood pressure and also has a problem with runny nose on only the left side it is made worse in the morning will run throughout the day and she has been trying to position her head so she does not have leaking from that.  Patient also does have posterior headaches that she will try and position her head so she does not have any headaches she has no has no nausea no vomiting no Brudzinski sign but she is concerned about possibly having a CSF leak because her mother had 1 of these at her same age.  Patient has no visual problems but she does have a significant amount of clear fluid leaking from the left nostril but not from the right.  At this time we are going to have her see ENT for an evaluation of the cribriform plate  process to see if she is any chyle leak and we will order a CAT scan of her skull to look for any kind of possible leaks at this point we will get a try just this she does not want a get too invasive if this is all negative we may want to consider about getting a spinal tap and neurological evaluation.  His blood pressure also is high today working to see about increasing her lisinopril to 20 mg once a day and we will see her back in approximately 4 to 6 weeks to recheck her blood pressure.          Review of Systems   Constitutional:  Negative for activity change, appetite change, fatigue and fever.   HENT:  Positive for rhinorrhea. Negative for congestion, ear pain, postnasal drip, sinus pressure, sinus pain, sneezing and sore throat.    Eyes:  Negative for pain and redness.   Respiratory:  Negative for apnea, cough, chest tightness, shortness of breath and wheezing.    Cardiovascular:  Negative for chest pain, palpitations and leg swelling.   Gastrointestinal:  Negative for abdominal pain, constipation, diarrhea, nausea and vomiting.   Endocrine: Negative for cold intolerance and heat intolerance.   Genitourinary:  Negative for difficulty urinating, dysuria, frequency, hematuria and urgency.   Musculoskeletal:  Negative for arthralgias, back pain, gait problem and myalgias.   Skin:  Negative for rash.   Neurological:  Negative for dizziness, speech difficulty, weakness, numbness and headaches.   Hematological:  Does not bruise/bleed easily.   Psychiatric/Behavioral:  Negative for agitation, confusion and hallucinations.            Objective     There were no vitals taken for this visit.    Physical Exam  Constitutional:       Appearance: Normal appearance. She is not ill-appearing.   HENT:      Head: Normocephalic and atraumatic.      Right Ear: Tympanic membrane normal.      Left Ear: Tympanic membrane normal.      Nose: Nose normal.      Mouth/Throat:      Mouth: Mucous membranes are moist.   Eyes:       Extraocular Movements: Extraocular movements intact.      Conjunctiva/sclera: Conjunctivae normal.      Pupils: Pupils are equal, round, and reactive to light.   Cardiovascular:      Rate and Rhythm: Normal rate and regular rhythm.   Pulmonary:      Effort: Pulmonary effort is normal. No respiratory distress.      Breath sounds: Normal breath sounds. No wheezing.   Abdominal:      General: Bowel sounds are normal.      Palpations: Abdomen is soft.      Tenderness: There is no abdominal tenderness.   Musculoskeletal:         General: No tenderness. Normal range of motion.      Cervical back: Normal range of motion and neck supple.      Right lower leg: No edema.      Left lower leg: No edema.   Skin:     General: Skin is warm and dry.   Neurological:      Mental Status: She is alert and oriented to person, place, and time.   Psychiatric:         Mood and Affect: Mood normal.         Behavior: Behavior normal.         Thought Content: Thought content normal.         Judgment: Judgment normal.

## 2024-10-28 ENCOUNTER — OFFICE VISIT (OUTPATIENT)
Dept: FAMILY MEDICINE CLINIC | Facility: CLINIC | Age: 44
End: 2024-10-28
Payer: COMMERCIAL

## 2024-10-28 VITALS
BODY MASS INDEX: 29.81 KG/M2 | SYSTOLIC BLOOD PRESSURE: 154 MMHG | HEIGHT: 62 IN | RESPIRATION RATE: 16 BRPM | DIASTOLIC BLOOD PRESSURE: 100 MMHG | HEART RATE: 73 BPM | OXYGEN SATURATION: 98 % | TEMPERATURE: 98.1 F | WEIGHT: 162 LBS

## 2024-10-28 DIAGNOSIS — G96.01 CSF LEAK FROM NOSE: ICD-10-CM

## 2024-10-28 DIAGNOSIS — I10 PRIMARY HYPERTENSION: ICD-10-CM

## 2024-10-28 DIAGNOSIS — Z23 ENCOUNTER FOR IMMUNIZATION: ICD-10-CM

## 2024-10-28 DIAGNOSIS — L71.9 ROSACEA: ICD-10-CM

## 2024-10-28 DIAGNOSIS — I10 ESSENTIAL (PRIMARY) HYPERTENSION: ICD-10-CM

## 2024-10-28 DIAGNOSIS — Z00.00 WELL ADULT EXAM: Primary | ICD-10-CM

## 2024-10-28 PROCEDURE — 90471 IMMUNIZATION ADMIN: CPT | Performed by: FAMILY MEDICINE

## 2024-10-28 PROCEDURE — 90656 IIV3 VACC NO PRSV 0.5 ML IM: CPT | Performed by: FAMILY MEDICINE

## 2024-10-28 PROCEDURE — 99396 PREV VISIT EST AGE 40-64: CPT | Performed by: FAMILY MEDICINE

## 2024-10-28 RX ORDER — LISINOPRIL 20 MG/1
20 TABLET ORAL DAILY
Qty: 90 TABLET | Refills: 1 | Status: SHIPPED | OUTPATIENT
Start: 2024-10-28

## 2024-10-31 ENCOUNTER — HOSPITAL ENCOUNTER (OUTPATIENT)
Dept: CT IMAGING | Facility: HOSPITAL | Age: 44
End: 2024-10-31
Payer: COMMERCIAL

## 2024-10-31 DIAGNOSIS — G96.01 CSF LEAK FROM NOSE: ICD-10-CM

## 2024-10-31 PROCEDURE — 70450 CT HEAD/BRAIN W/O DYE: CPT

## 2024-12-09 ENCOUNTER — APPOINTMENT (OUTPATIENT)
Dept: LAB | Facility: MEDICAL CENTER | Age: 44
End: 2024-12-09
Payer: COMMERCIAL

## 2024-12-09 DIAGNOSIS — G96.01 CSF LEAK FROM NOSE: ICD-10-CM

## 2024-12-09 DIAGNOSIS — J34.89 RHINORRHEA: ICD-10-CM

## 2024-12-09 PROCEDURE — 86335 IMMUNFIX E-PHORSIS/URINE/CSF: CPT

## 2024-12-27 DIAGNOSIS — L71.9 ROSACEA: ICD-10-CM

## 2024-12-27 RX ORDER — OXYMETAZOLINE HYDROCHLORIDE 30 G/1
CREAM TOPICAL DAILY PRN
Qty: 30 G | Refills: 3 | Status: SHIPPED | OUTPATIENT
Start: 2024-12-27

## 2025-01-10 ENCOUNTER — HOSPITAL ENCOUNTER (OUTPATIENT)
Dept: MRI IMAGING | Facility: HOSPITAL | Age: 45
End: 2025-01-10
Attending: OTOLARYNGOLOGY
Payer: COMMERCIAL

## 2025-01-10 ENCOUNTER — HOSPITAL ENCOUNTER (OUTPATIENT)
Dept: CT IMAGING | Facility: HOSPITAL | Age: 45
End: 2025-01-10
Attending: OTOLARYNGOLOGY
Payer: COMMERCIAL

## 2025-01-10 DIAGNOSIS — J34.89 RHINORRHEA: ICD-10-CM

## 2025-01-10 DIAGNOSIS — G96.01 CSF LEAK FROM NOSE: ICD-10-CM

## 2025-01-10 PROCEDURE — 70551 MRI BRAIN STEM W/O DYE: CPT

## 2025-01-10 PROCEDURE — 70486 CT MAXILLOFACIAL W/O DYE: CPT

## 2025-01-21 NOTE — H&P (VIEW-ONLY)
[facility]  OTOLARYNGOLOGY - HEAD & NECK SURGERY    Lynda Shaffer  [id number]  1980    HISTORY & PHYSICAL    Assessment:  1. CSF leak from nose        2. Rhinorrhea                 Plan: At this point, the patient has a positive beta-2 transferrin test from left-sided rhinorrhea.  New imaging including MRI sinus as well as CT sinus with navigation protocol indicate that the most likely leaking site is the sphenoid sinus.  She does have issues with headache, which I believe are low pressure headaches from the leaking, as well as some evidence of intracranial hypertension which includes or prior evidence of partially empty sella.  This is likely the most common cause of the leak.  Will plan for endoscopic CSF repair of the sphenoid sinus.  I told her that I may use some septal cartilage or bone for the repair as well as possible abdominal fat graft.  Because of the nature of spontaneous leaking of intracranial hypertension we will likely use diversion for a couple days with lumbar drain which we will do in conjunction with neurosurgery.  She understands all the risk and benefits of the procedure and does wish to proceed.  Will follow-up at the time of surgery once we can coordinate a date.  I did also inform her on the signs and symptoms of meningitis just as a precaution.    -------------------------------------------------      History of Present Illness: 44-year-old woman who presents for follow-up.  She continues to have leakage from the left side.  This is especially when she bends down.  She is also having some issues with headaches at this time as well.  She did go for workup including beta-2 transferrin testing as well as further imaging.  She is here to review this.      HISTORICALLY: 44-year-old woman who presents for further evaluation of leakage from the left side of her nose.  The patient states that it started around February and March.  Whenever she leaned over to tie her  shoes in the morning especially fluid would come out of her nose.  This is specifically the left side.  It is pretty watery and salty.  At 1 point she had to put a paper towel to plug it up.  She believes that she can collect it.  It is not discolored.  She has been having some headaches since that time as well.  She states that the headaches can be quite severe at times lasting days.  She has tried multiple allergy medicines as well as Flonase nasal spray.  She did have a CT scan that this showed a sinus cyst but not much evidence of skull base defect.  No other major issues at this time.        Allergies   Allergen Reactions    Levofloxacin      Reaction Date: [id number];        Past Medical History:   Diagnosis Date    Allergic     Anxiety     GERD (gastroesophageal reflux disease)     Hypertension     Ovarian cyst     Sinus headache     probably from elevated BP    Sleep disorder        Past Surgical History:   Procedure Laterality Date    ANKLE SURGERY Right     1998, 1999    MOLE REMOVAL      from neck; benign    OOPHORECTOMY Right     OVARIAN CYST REMOVAL      TUBAL LIGATION      WISDOM TOOTH EXTRACTION  2012    x2    WRIST SURGERY  2013       Family History   Problem Relation Age of Onset    No Known Problems Mother     Skin cancer Father     No Known Problems Daughter     No Known Problems Maternal Grandmother     No Known Problems Maternal Grandfather     No Known Problems Paternal Grandmother     No Known Problems Paternal Grandfather     Breast cancer Maternal Aunt 50    No Known Problems Brother     No Known Problems Brother     No Known Problems Son     No Known Problems Maternal Uncle     No Known Problems Maternal Uncle     No Known Problems Maternal Uncle     No Known Problems Maternal Uncle     No Known Problems Paternal Aunt     No Known Problems Paternal Aunt     No Known Problems Paternal Uncle         Social History     Tobacco Use    Smoking status: Never     Passive exposure: Never     "Smokeless tobacco: Never   Vaping Use    Vaping status: Never Used   Substance Use Topics    Alcohol use: Yes     Alcohol/week: 0.0 standard drinks of alcohol     Comment: occasional    Drug use: Never       Current Outpatient Medications on File Prior to Visit   Medication Sig Dispense Refill    ipratropium (ATROVENT) 0.03 % nasal spray 2 sprays into each nostril 2 (two) times a day 30 mL 5    lisinopril (ZESTRIL) 20 mg tablet Take 1 tablet (20 mg total) by mouth daily 90 tablet 1    olopatadine (PATANOL) 0.1 % ophthalmic solution ADMINISTER 1 drop into both eyes twice a day 5 mL 0    Rhofade 1 % CREA Apply topically daily as needed (with FLARES) 30 g 3     No current facility-administered medications on file prior to visit.       Review of Systems:  10-point ROS performed.  Patient denies fevers or chills.  All other systems reviewed and found to be negative unless otherwise noted in the HPI or MA note.       Vitals:    01/21/25 0736   Weight: 73.5 kg (162 lb)   Height: 5' 2\" (1.575 m)         General Appearance: No apparent distress    Head: Normocephalic, atraumatic.     Face: Symmetric without obvious lesions.    Eyes: Conjunctiva clear, extraocular movements are intact.    Ears: Pinna normal shape and position.  Canals are clear.  TMs intact with no middle ear effusion.    Nose: External pyramid midline.  Mucosa appears healthy.  Turbinates are normal in size.  Septum relatively midline.      Oral cavity/Oropharynx: No mucosal lesions, masses, or pharyngeal asymmetry.     Neck: No cervical lymphadenopathy or masses appreciated    Skin: Skin warm and dry.    Neurological: Cranial nerves II to XII are intact.    Respiratory: No stridor or labored breathing.    Cardiovascular: Good perfusion of the upper extremities.  No cyanosis of the fingers or hands.     Psychiatric: Alert and oriented.      Data Reviewed:    IMPRESSION:     Evidence of possible site of CSF leak posterior aspect of the sphenoid sinus along " the margin of the sella turcica as discussed above.  Nonspecific loculated CSF fluid equivalent signal intensity structure within the sphenoid sinus detailed above and   unchanged when compared to head CT 12/1/2016. 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     Mild  altered signal involving white matter discussed above is a nonspecific finding most often relating to chronic small vessel white matter ischemic disease.    ----    Beta 2 transferrin test is positive.    Chele St MD  Otolaryngology - Head & Neck Surgery  [facility]            ** Please Note: Dictation voice to text software may have been used in the creation of this document. **

## 2025-02-03 ENCOUNTER — APPOINTMENT (OUTPATIENT)
Dept: LAB | Facility: MEDICAL CENTER | Age: 45
End: 2025-02-03
Payer: COMMERCIAL

## 2025-02-03 ENCOUNTER — APPOINTMENT (OUTPATIENT)
Dept: LAB | Facility: HOSPITAL | Age: 45
End: 2025-02-03
Attending: OTOLARYNGOLOGY
Payer: COMMERCIAL

## 2025-02-03 DIAGNOSIS — Z01.818 OTHER SPECIFIED PRE-OPERATIVE EXAMINATION: ICD-10-CM

## 2025-02-03 DIAGNOSIS — Z01.818 PRE-OP TESTING: ICD-10-CM

## 2025-02-03 LAB
ANION GAP SERPL CALCULATED.3IONS-SCNC: 8 MMOL/L (ref 4–13)
BASOPHILS # BLD AUTO: 0.01 THOUSANDS/ΜL (ref 0–0.1)
BASOPHILS NFR BLD AUTO: 0 % (ref 0–1)
BUN SERPL-MCNC: 14 MG/DL (ref 5–25)
CALCIUM SERPL-MCNC: 9.5 MG/DL (ref 8.4–10.2)
CHLORIDE SERPL-SCNC: 108 MMOL/L (ref 96–108)
CO2 SERPL-SCNC: 25 MMOL/L (ref 21–32)
CREAT SERPL-MCNC: 0.78 MG/DL (ref 0.6–1.3)
EOSINOPHIL # BLD AUTO: 0.03 THOUSAND/ΜL (ref 0–0.61)
EOSINOPHIL NFR BLD AUTO: 1 % (ref 0–6)
ERYTHROCYTE [DISTWIDTH] IN BLOOD BY AUTOMATED COUNT: 12.9 % (ref 11.6–15.1)
GFR SERPL CREATININE-BSD FRML MDRD: 93 ML/MIN/1.73SQ M
GLUCOSE P FAST SERPL-MCNC: 102 MG/DL (ref 65–99)
HCT VFR BLD AUTO: 42 % (ref 34.8–46.1)
HGB BLD-MCNC: 14 G/DL (ref 11.5–15.4)
IMM GRANULOCYTES # BLD AUTO: 0.01 THOUSAND/UL (ref 0–0.2)
IMM GRANULOCYTES NFR BLD AUTO: 0 % (ref 0–2)
LYMPHOCYTES # BLD AUTO: 2.76 THOUSANDS/ΜL (ref 0.6–4.47)
LYMPHOCYTES NFR BLD AUTO: 45 % (ref 14–44)
MCH RBC QN AUTO: 29.2 PG (ref 26.8–34.3)
MCHC RBC AUTO-ENTMCNC: 33.3 G/DL (ref 31.4–37.4)
MCV RBC AUTO: 88 FL (ref 82–98)
MONOCYTES # BLD AUTO: 0.48 THOUSAND/ΜL (ref 0.17–1.22)
MONOCYTES NFR BLD AUTO: 8 % (ref 4–12)
NEUTROPHILS # BLD AUTO: 2.85 THOUSANDS/ΜL (ref 1.85–7.62)
NEUTS SEG NFR BLD AUTO: 46 % (ref 43–75)
NRBC BLD AUTO-RTO: 0 /100 WBCS
PLATELET # BLD AUTO: 240 THOUSANDS/UL (ref 149–390)
PMV BLD AUTO: 10.5 FL (ref 8.9–12.7)
POTASSIUM SERPL-SCNC: 4.4 MMOL/L (ref 3.5–5.3)
RBC # BLD AUTO: 4.8 MILLION/UL (ref 3.81–5.12)
SODIUM SERPL-SCNC: 141 MMOL/L (ref 135–147)
WBC # BLD AUTO: 6.14 THOUSAND/UL (ref 4.31–10.16)

## 2025-02-03 PROCEDURE — 80048 BASIC METABOLIC PNL TOTAL CA: CPT

## 2025-02-03 PROCEDURE — 36415 COLL VENOUS BLD VENIPUNCTURE: CPT

## 2025-02-03 PROCEDURE — 85025 COMPLETE CBC W/AUTO DIFF WBC: CPT

## 2025-02-05 RX ORDER — ZINC GLUCONATE 50 MG
50 TABLET ORAL DAILY
COMMUNITY

## 2025-02-05 NOTE — PRE-PROCEDURE INSTRUCTIONS
Pre-Surgery Instructions:   Medication Instructions    lisinopril (ZESTRIL) 20 mg tablet Take night before surgery    Rhofade 1 % CREA Hold day of surgery.    zinc gluconate 50 mg tablet Stop taking 7 days prior to surgery.   Medication instructions for day surgery reviewed. Please use only a sip of water to take your instructed medications. Avoid all over the counter vitamins, supplements and NSAIDS for one week prior to surgery per anesthesia guidelines. Tylenol is ok to take as needed.     You will receive a call one business day prior to surgery with an arrival time and hospital directions. If your surgery is scheduled on a Monday, the hospital will be calling you on the Friday prior to your surgery. If you have not heard from anyone by 8pm, please call the hospital supervisor through the hospital  at [phone] or [place] [phone]).    Do not eat or drink anything after midnight the night before your surgery, including candy, mints, lifesavers, or chewing gum. Do not drink alcohol 24hrs before your surgery. Try not to smoke at least 24hrs before your surgery.       Follow the pre surgery showering instructions as listed in the “My Surgical Experience Booklet” or otherwise provided by your surgeon's office. Do not use a blade to shave the surgical area 1 week before surgery. It is okay to use a clean electric clippers up to 24 hours before surgery. Do not apply any lotions, creams, including makeup, cologne, deodorant, or perfumes after showering on the day of your surgery. Do not use dry shampoo, hair spray, hair gel, or any type of hair products.     No contact lenses, eye make-up, or artificial eyelashes. Remove nail polish, including gel polish, and any artificial, gel, or acrylic nails if possible. Remove all jewelry including rings and body piercing jewelry.     Wear causal clothing that is easy to take on and off. Consider your type of surgery.    Keep any valuables, jewelry, piercings at  home. Please bring any specially ordered equipment (sling, braces) if indicated.    Arrange for a responsible person to drive you to and from the hospital on the day of your surgery. Please confirm the visitor policy for the day of your procedure when you receive your phone call with an arrival time.     Call the surgeon's office with any new illnesses, exposures, or additional questions prior to surgery.    Please reference your “My Surgical Experience Booklet” for additional information to prepare for your upcoming surgery.

## 2025-02-07 LAB
ATRIAL RATE: 86 BPM
P AXIS: 32 DEGREES
PR INTERVAL: 168 MS
QRS AXIS: 95 DEGREES
QRSD INTERVAL: 80 MS
QT INTERVAL: 388 MS
QTC INTERVAL: 465 MS
T WAVE AXIS: 37 DEGREES
VENTRICULAR RATE: 86 BPM

## 2025-02-07 PROCEDURE — 93010 ELECTROCARDIOGRAM REPORT: CPT | Performed by: INTERNAL MEDICINE

## 2025-02-12 ENCOUNTER — ANESTHESIA EVENT (OUTPATIENT)
Dept: PERIOP | Facility: HOSPITAL | Age: 45
DRG: 988 | End: 2025-02-12
Payer: COMMERCIAL

## 2025-02-12 NOTE — ANESTHESIA PREPROCEDURE EVALUATION
Procedure:  FUNCTIONAL ENDOSCOPIC SINUS SURGERY (FESS) IMAGED GUIDED SPHENOID SINUS, (Nose)  REPAIR CSF LEAK TRANSNASAL (Nose)  SEPTOPLASTY (Nose)  ABDOMINAL FAT GAFT (Bilateral: Abdomen)  LUMBAR  DRAIN PLACEMENT (Abdomen)    Relevant Problems   CARDIO   (+) Hypertension      PONV    HTN    GERD      Physical Exam    Airway    Mallampati score: II  TM Distance: >3 FB  Neck ROM: full     Dental   No notable dental hx     Cardiovascular  Cardiovascular exam normal    Pulmonary  Pulmonary exam normal     Other Findings  post-pubertal.      Anesthesia Plan  ASA Score- 2     Anesthesia Type- general with ASA Monitors.         Additional Monitors:     Airway Plan: ETT.           Plan Factors-Exercise tolerance (METS): >4 METS.    Chart reviewed.  Imaging results reviewed. Existing labs reviewed. Patient summary reviewed.    Patient is not a current smoker.      Obstructive sleep apnea risk education given perioperatively.        Induction- intravenous.    Postoperative Plan-     Perioperative Resuscitation Plan - Level 1 - Full Code.       Informed Consent- Anesthetic plan and risks discussed with patient.  I personally reviewed this patient with the CRNA. Discussed and agreed on the Anesthesia Plan with the CRNA..      NPO Status:  No vitals data found for the desired time range.

## 2025-02-13 ENCOUNTER — ANESTHESIA (OUTPATIENT)
Dept: PERIOP | Facility: HOSPITAL | Age: 45
DRG: 988 | End: 2025-02-13
Payer: COMMERCIAL

## 2025-02-13 ENCOUNTER — HOSPITAL ENCOUNTER (INPATIENT)
Facility: HOSPITAL | Age: 45
LOS: 4 days | Discharge: HOME | DRG: 988 | End: 2025-02-17
Attending: OTOLARYNGOLOGY | Admitting: OTOLARYNGOLOGY
Payer: COMMERCIAL

## 2025-02-13 DIAGNOSIS — G96.00 CSF LEAK: Primary | ICD-10-CM

## 2025-02-13 DIAGNOSIS — L71.9 ROSACEA: ICD-10-CM

## 2025-02-13 LAB
ABO GROUP BLD: NORMAL
ABO GROUP BLD: NORMAL
BLD GP AB SCN SERPL QL: NEGATIVE
EXT PREGNANCY TEST URINE: NEGATIVE
EXT. CONTROL: NORMAL
PLATELET # BLD AUTO: 219 THOUSANDS/UL (ref 149–390)
PMV BLD AUTO: 10 FL (ref 8.9–12.7)
RH BLD: POSITIVE
RH BLD: POSITIVE
SPECIMEN EXPIRATION DATE: NORMAL
T4 FREE SERPL-MCNC: 1.08 NG/DL (ref 0.61–1.12)
TSH SERPL DL<=0.05 MIU/L-ACNC: 0.57 UIU/ML (ref 0.45–4.5)

## 2025-02-13 PROCEDURE — 99232 SBSQ HOSP IP/OBS MODERATE 35: CPT | Performed by: SPECIALIST

## 2025-02-13 PROCEDURE — 15769 GRFG AUTOL SOFT TISS DIR EXC: CPT | Performed by: OTOLARYNGOLOGY

## 2025-02-13 PROCEDURE — 84439 ASSAY OF FREE THYROXINE: CPT | Performed by: PSYCHIATRY & NEUROLOGY

## 2025-02-13 PROCEDURE — 09UM47Z SUPPLEMENT NASAL SEPTUM WITH AUTOLOGOUS TISSUE SUBSTITUTE, PERCUTANEOUS ENDOSCOPIC APPROACH: ICD-10-PCS | Performed by: OTOLARYNGOLOGY

## 2025-02-13 PROCEDURE — 93005 ELECTROCARDIOGRAM TRACING: CPT

## 2025-02-13 PROCEDURE — 0JD83ZZ EXTRACTION OF ABDOMEN SUBCUTANEOUS TISSUE AND FASCIA, PERCUTANEOUS APPROACH: ICD-10-PCS | Performed by: OTOLARYNGOLOGY

## 2025-02-13 PROCEDURE — 99291 CRITICAL CARE FIRST HOUR: CPT | Performed by: PSYCHIATRY & NEUROLOGY

## 2025-02-13 PROCEDURE — 62272 THER SPI PNXR DRG CSF: CPT | Performed by: SPECIALIST

## 2025-02-13 PROCEDURE — NC001 PR NO CHARGE: Performed by: SPECIALIST

## 2025-02-13 PROCEDURE — C1765 ADHESION BARRIER: HCPCS | Performed by: OTOLARYNGOLOGY

## 2025-02-13 PROCEDURE — 61782 SCAN PROC CRANIAL EXTRA: CPT | Performed by: OTOLARYNGOLOGY

## 2025-02-13 PROCEDURE — 84443 ASSAY THYROID STIM HORMONE: CPT | Performed by: PSYCHIATRY & NEUROLOGY

## 2025-02-13 PROCEDURE — 09BK4ZZ EXCISION OF NASAL MUCOSA AND SOFT TISSUE, PERCUTANEOUS ENDOSCOPIC APPROACH: ICD-10-PCS | Performed by: OTOLARYNGOLOGY

## 2025-02-13 PROCEDURE — 86900 BLOOD TYPING SEROLOGIC ABO: CPT | Performed by: ANESTHESIOLOGY

## 2025-02-13 PROCEDURE — 20912 REMOVE CARTILAGE FOR GRAFT: CPT | Performed by: OTOLARYNGOLOGY

## 2025-02-13 PROCEDURE — 81025 URINE PREGNANCY TEST: CPT | Performed by: OTOLARYNGOLOGY

## 2025-02-13 PROCEDURE — C1729 CATH, DRAINAGE: HCPCS | Performed by: OTOLARYNGOLOGY

## 2025-02-13 PROCEDURE — 86850 RBC ANTIBODY SCREEN: CPT | Performed by: ANESTHESIOLOGY

## 2025-02-13 PROCEDURE — 009U30Z DRAINAGE OF SPINAL CANAL WITH DRAINAGE DEVICE, PERCUTANEOUS APPROACH: ICD-10-PCS | Performed by: SPECIALIST

## 2025-02-13 PROCEDURE — 86901 BLOOD TYPING SEROLOGIC RH(D): CPT | Performed by: ANESTHESIOLOGY

## 2025-02-13 PROCEDURE — 31291 NASAL/SINUS ENDOSCOPY SURG: CPT | Performed by: OTOLARYNGOLOGY

## 2025-02-13 PROCEDURE — 85049 AUTOMATED PLATELET COUNT: CPT

## 2025-02-13 RX ORDER — LIDOCAINE HYDROCHLORIDE AND EPINEPHRINE 10; 10 MG/ML; UG/ML
INJECTION, SOLUTION INFILTRATION; PERINEURAL AS NEEDED
Status: DISCONTINUED | OUTPATIENT
Start: 2025-02-13 | End: 2025-02-13 | Stop reason: HOSPADM

## 2025-02-13 RX ORDER — SODIUM CHLORIDE, SODIUM LACTATE, POTASSIUM CHLORIDE, CALCIUM CHLORIDE 600; 310; 30; 20 MG/100ML; MG/100ML; MG/100ML; MG/100ML
INJECTION, SOLUTION INTRAVENOUS CONTINUOUS PRN
Status: DISCONTINUED | OUTPATIENT
Start: 2025-02-13 | End: 2025-02-13

## 2025-02-13 RX ORDER — HYDROMORPHONE HCL/PF 1 MG/ML
0.5 SYRINGE (ML) INJECTION
Status: DISCONTINUED | OUTPATIENT
Start: 2025-02-13 | End: 2025-02-13 | Stop reason: HOSPADM

## 2025-02-13 RX ORDER — ACETAZOLAMIDE 250 MG/1
500 TABLET ORAL EVERY 12 HOURS SCHEDULED
Status: DISCONTINUED | OUTPATIENT
Start: 2025-02-13 | End: 2025-02-16

## 2025-02-13 RX ORDER — LISINOPRIL 20 MG/1
20 TABLET ORAL
Status: DISCONTINUED | OUTPATIENT
Start: 2025-02-13 | End: 2025-02-15

## 2025-02-13 RX ORDER — ONDANSETRON 2 MG/ML
4 INJECTION INTRAMUSCULAR; INTRAVENOUS EVERY 4 HOURS PRN
Status: DISCONTINUED | OUTPATIENT
Start: 2025-02-13 | End: 2025-02-13

## 2025-02-13 RX ORDER — OXYCODONE HYDROCHLORIDE 5 MG/1
5 TABLET ORAL EVERY 6 HOURS PRN
Refills: 0 | Status: DISCONTINUED | OUTPATIENT
Start: 2025-02-13 | End: 2025-02-14

## 2025-02-13 RX ORDER — ONDANSETRON 2 MG/ML
4 INJECTION INTRAMUSCULAR; INTRAVENOUS EVERY 6 HOURS PRN
Status: DISCONTINUED | OUTPATIENT
Start: 2025-02-13 | End: 2025-02-13

## 2025-02-13 RX ORDER — GLYCOPYRROLATE 0.2 MG/ML
INJECTION INTRAMUSCULAR; INTRAVENOUS AS NEEDED
Status: DISCONTINUED | OUTPATIENT
Start: 2025-02-13 | End: 2025-02-13

## 2025-02-13 RX ORDER — POLYETHYLENE GLYCOL 3350 17 G/17G
17 POWDER, FOR SOLUTION ORAL DAILY
Status: DISCONTINUED | OUTPATIENT
Start: 2025-02-14 | End: 2025-02-15

## 2025-02-13 RX ORDER — ACETAMINOPHEN 325 MG/1
650 TABLET ORAL EVERY 6 HOURS PRN
Status: DISCONTINUED | OUTPATIENT
Start: 2025-02-13 | End: 2025-02-14

## 2025-02-13 RX ORDER — PROPOFOL 10 MG/ML
INJECTION, EMULSION INTRAVENOUS CONTINUOUS PRN
Status: DISCONTINUED | OUTPATIENT
Start: 2025-02-13 | End: 2025-02-13

## 2025-02-13 RX ORDER — ACETAZOLAMIDE 250 MG/1
500 TABLET ORAL EVERY 12 HOURS SCHEDULED
Qty: 120 TABLET | Refills: 0 | Status: CANCELLED | OUTPATIENT
Start: 2025-02-13

## 2025-02-13 RX ORDER — MIDAZOLAM HYDROCHLORIDE 2 MG/2ML
INJECTION, SOLUTION INTRAMUSCULAR; INTRAVENOUS AS NEEDED
Status: DISCONTINUED | OUTPATIENT
Start: 2025-02-13 | End: 2025-02-13

## 2025-02-13 RX ORDER — ROCURONIUM BROMIDE 10 MG/ML
INJECTION, SOLUTION INTRAVENOUS AS NEEDED
Status: DISCONTINUED | OUTPATIENT
Start: 2025-02-13 | End: 2025-02-13

## 2025-02-13 RX ORDER — DEXAMETHASONE SODIUM PHOSPHATE 10 MG/ML
INJECTION, SOLUTION INTRAMUSCULAR; INTRAVENOUS AS NEEDED
Status: DISCONTINUED | OUTPATIENT
Start: 2025-02-13 | End: 2025-02-13

## 2025-02-13 RX ORDER — CEFAZOLIN SODIUM 2 G/50ML
2000 SOLUTION INTRAVENOUS ONCE
Status: DISCONTINUED | OUTPATIENT
Start: 2025-02-13 | End: 2025-02-13

## 2025-02-13 RX ORDER — HEPARIN SODIUM 5000 [USP'U]/ML
5000 INJECTION, SOLUTION INTRAVENOUS; SUBCUTANEOUS EVERY 8 HOURS SCHEDULED
Status: DISCONTINUED | OUTPATIENT
Start: 2025-02-13 | End: 2025-02-15

## 2025-02-13 RX ORDER — CEFAZOLIN SODIUM 2 G/50ML
SOLUTION INTRAVENOUS AS NEEDED
Status: DISCONTINUED | OUTPATIENT
Start: 2025-02-13 | End: 2025-02-13

## 2025-02-13 RX ORDER — LIDOCAINE HYDROCHLORIDE 10 MG/ML
INJECTION, SOLUTION EPIDURAL; INFILTRATION; INTRACAUDAL; PERINEURAL AS NEEDED
Status: DISCONTINUED | OUTPATIENT
Start: 2025-02-13 | End: 2025-02-13

## 2025-02-13 RX ORDER — OXYMETAZOLINE HYDROCHLORIDE 0.05 G/100ML
SPRAY NASAL AS NEEDED
Status: DISCONTINUED | OUTPATIENT
Start: 2025-02-13 | End: 2025-02-13 | Stop reason: HOSPADM

## 2025-02-13 RX ORDER — IBUPROFEN 100 MG/5ML
400 SUSPENSION ORAL EVERY 6 HOURS PRN
Status: DISCONTINUED | OUTPATIENT
Start: 2025-02-13 | End: 2025-02-13

## 2025-02-13 RX ORDER — SCOPOLAMINE 1 MG/3D
1 PATCH, EXTENDED RELEASE TRANSDERMAL
Status: DISCONTINUED | OUTPATIENT
Start: 2025-02-13 | End: 2025-02-15

## 2025-02-13 RX ORDER — ONDANSETRON 2 MG/ML
INJECTION INTRAMUSCULAR; INTRAVENOUS AS NEEDED
Status: DISCONTINUED | OUTPATIENT
Start: 2025-02-13 | End: 2025-02-13

## 2025-02-13 RX ORDER — FENTANYL CITRATE 50 UG/ML
INJECTION, SOLUTION INTRAMUSCULAR; INTRAVENOUS AS NEEDED
Status: DISCONTINUED | OUTPATIENT
Start: 2025-02-13 | End: 2025-02-13

## 2025-02-13 RX ORDER — ONDANSETRON 2 MG/ML
4 INJECTION INTRAMUSCULAR; INTRAVENOUS
Status: DISCONTINUED | OUTPATIENT
Start: 2025-02-13 | End: 2025-02-15

## 2025-02-13 RX ORDER — MAGNESIUM HYDROXIDE 1200 MG/15ML
LIQUID ORAL AS NEEDED
Status: DISCONTINUED | OUTPATIENT
Start: 2025-02-13 | End: 2025-02-13 | Stop reason: HOSPADM

## 2025-02-13 RX ORDER — SODIUM CHLORIDE, SODIUM GLUCONATE, SODIUM ACETATE, POTASSIUM CHLORIDE, MAGNESIUM CHLORIDE, SODIUM PHOSPHATE, DIBASIC, AND POTASSIUM PHOSPHATE .53; .5; .37; .037; .03; .012; .00082 G/100ML; G/100ML; G/100ML; G/100ML; G/100ML; G/100ML; G/100ML
100 INJECTION, SOLUTION INTRAVENOUS CONTINUOUS
Status: DISCONTINUED | OUTPATIENT
Start: 2025-02-13 | End: 2025-02-14

## 2025-02-13 RX ORDER — PROPOFOL 10 MG/ML
INJECTION, EMULSION INTRAVENOUS AS NEEDED
Status: DISCONTINUED | OUTPATIENT
Start: 2025-02-13 | End: 2025-02-13

## 2025-02-13 RX ORDER — CEFUROXIME AXETIL 500 MG/1
500 TABLET ORAL EVERY 12 HOURS SCHEDULED
Qty: 20 TABLET | Refills: 0 | Status: CANCELLED | OUTPATIENT
Start: 2025-02-13 | End: 2025-02-23

## 2025-02-13 RX ORDER — CEFUROXIME AXETIL 250 MG/1
500 TABLET ORAL EVERY 12 HOURS SCHEDULED
Status: DISCONTINUED | OUTPATIENT
Start: 2025-02-13 | End: 2025-02-17 | Stop reason: HOSPADM

## 2025-02-13 RX ORDER — EPHEDRINE SULFATE 50 MG/ML
INJECTION INTRAVENOUS AS NEEDED
Status: DISCONTINUED | OUTPATIENT
Start: 2025-02-13 | End: 2025-02-13

## 2025-02-13 RX ORDER — DOCUSATE SODIUM 100 MG/1
100 CAPSULE, LIQUID FILLED ORAL 2 TIMES DAILY
Status: DISCONTINUED | OUTPATIENT
Start: 2025-02-13 | End: 2025-02-15

## 2025-02-13 RX ORDER — EPINEPHRINE NASAL SOLUTION 1 MG/ML
SOLUTION NASAL AS NEEDED
Status: DISCONTINUED | OUTPATIENT
Start: 2025-02-13 | End: 2025-02-13 | Stop reason: HOSPADM

## 2025-02-13 RX ADMIN — PROPOFOL 130 MCG/KG/MIN: 10 INJECTION, EMULSION INTRAVENOUS at 08:11

## 2025-02-13 RX ADMIN — PHENYLEPHRINE HYDROCHLORIDE 50 MCG/MIN: 10 INJECTION INTRAVENOUS at 08:17

## 2025-02-13 RX ADMIN — EPHEDRINE SULFATE 5 MG: 50 INJECTION, SOLUTION INTRAVENOUS at 08:42

## 2025-02-13 RX ADMIN — SODIUM CHLORIDE, SODIUM LACTATE, POTASSIUM CHLORIDE, AND CALCIUM CHLORIDE: .6; .31; .03; .02 INJECTION, SOLUTION INTRAVENOUS at 08:00

## 2025-02-13 RX ADMIN — LIDOCAINE HYDROCHLORIDE 50 MG: 10 INJECTION, SOLUTION EPIDURAL; INFILTRATION; INTRACAUDAL at 08:04

## 2025-02-13 RX ADMIN — ROCURONIUM 20 MG: 50 INJECTION, SOLUTION INTRAVENOUS at 09:16

## 2025-02-13 RX ADMIN — OXYCODONE HYDROCHLORIDE 5 MG: 5 TABLET ORAL at 20:52

## 2025-02-13 RX ADMIN — DEXAMETHASONE SODIUM PHOSPHATE 10 MG: 10 INJECTION, SOLUTION INTRAMUSCULAR; INTRAVENOUS at 08:19

## 2025-02-13 RX ADMIN — PROPOFOL 200 MG: 10 INJECTION, EMULSION INTRAVENOUS at 08:04

## 2025-02-13 RX ADMIN — DEXMEDETOMIDINE HYDROCHLORIDE 8 MCG: 100 INJECTION, SOLUTION INTRAVENOUS at 09:10

## 2025-02-13 RX ADMIN — ACETAZOLAMIDE 500 MG: 250 TABLET ORAL at 22:31

## 2025-02-13 RX ADMIN — FENTANYL CITRATE 25 MCG: 50 INJECTION INTRAMUSCULAR; INTRAVENOUS at 09:18

## 2025-02-13 RX ADMIN — DOCUSATE SODIUM 100 MG: 100 CAPSULE, LIQUID FILLED ORAL at 17:35

## 2025-02-13 RX ADMIN — SODIUM CHLORIDE, SODIUM LACTATE, POTASSIUM CHLORIDE, CALCIUM CHLORIDE: 600; 310; 30; 20 INJECTION, SOLUTION INTRAVENOUS at 08:12

## 2025-02-13 RX ADMIN — CEFAZOLIN SODIUM 2000 MG: 2 SOLUTION INTRAVENOUS at 08:15

## 2025-02-13 RX ADMIN — CEFUROXIME AXETIL 500 MG: 250 TABLET, FILM COATED ORAL at 17:35

## 2025-02-13 RX ADMIN — FENTANYL CITRATE 25 MCG: 50 INJECTION INTRAMUSCULAR; INTRAVENOUS at 12:08

## 2025-02-13 RX ADMIN — SODIUM CHLORIDE, SODIUM LACTATE, POTASSIUM CHLORIDE, AND CALCIUM CHLORIDE: .6; .31; .03; .02 INJECTION, SOLUTION INTRAVENOUS at 10:54

## 2025-02-13 RX ADMIN — FENTANYL CITRATE 50 MCG: 50 INJECTION INTRAMUSCULAR; INTRAVENOUS at 08:04

## 2025-02-13 RX ADMIN — GLYCOPYRROLATE 0.1 MG: 0.2 INJECTION, SOLUTION INTRAMUSCULAR; INTRAVENOUS at 08:26

## 2025-02-13 RX ADMIN — DEXMEDETOMIDINE HYDROCHLORIDE 4 MCG: 100 INJECTION, SOLUTION INTRAVENOUS at 10:16

## 2025-02-13 RX ADMIN — SODIUM CHLORIDE, SODIUM GLUCONATE, SODIUM ACETATE, POTASSIUM CHLORIDE, MAGNESIUM CHLORIDE, SODIUM PHOSPHATE, DIBASIC, AND POTASSIUM PHOSPHATE 100 ML/HR: .53; .5; .37; .037; .03; .012; .00082 INJECTION, SOLUTION INTRAVENOUS at 20:31

## 2025-02-13 RX ADMIN — ONDANSETRON 4 MG: 2 INJECTION INTRAMUSCULAR; INTRAVENOUS at 12:05

## 2025-02-13 RX ADMIN — HEPARIN SODIUM 5000 UNITS: 5000 INJECTION INTRAVENOUS; SUBCUTANEOUS at 16:31

## 2025-02-13 RX ADMIN — HYDROMORPHONE HYDROCHLORIDE 0.5 MG: 1 INJECTION, SOLUTION INTRAMUSCULAR; INTRAVENOUS; SUBCUTANEOUS at 12:51

## 2025-02-13 RX ADMIN — EPHEDRINE SULFATE 5 MG: 50 INJECTION, SOLUTION INTRAVENOUS at 08:57

## 2025-02-13 RX ADMIN — DEXMEDETOMIDINE HYDROCHLORIDE 8 MCG: 100 INJECTION, SOLUTION INTRAVENOUS at 09:03

## 2025-02-13 RX ADMIN — ONDANSETRON 4 MG: 2 INJECTION INTRAMUSCULAR; INTRAVENOUS at 17:20

## 2025-02-13 RX ADMIN — PHENYLEPHRINE HYDROCHLORIDE 100 MCG: 10 INJECTION INTRAVENOUS at 08:19

## 2025-02-13 RX ADMIN — HYDROMORPHONE HYDROCHLORIDE 0.5 MG: 1 INJECTION, SOLUTION INTRAMUSCULAR; INTRAVENOUS; SUBCUTANEOUS at 13:16

## 2025-02-13 RX ADMIN — MIDAZOLAM 2 MG: 1 INJECTION INTRAMUSCULAR; INTRAVENOUS at 07:56

## 2025-02-13 RX ADMIN — ROCURONIUM 20 MG: 50 INJECTION, SOLUTION INTRAVENOUS at 10:17

## 2025-02-13 RX ADMIN — SUGAMMADEX 200 MG: 100 INJECTION, SOLUTION INTRAVENOUS at 12:02

## 2025-02-13 RX ADMIN — ROCURONIUM 10 MG: 50 INJECTION, SOLUTION INTRAVENOUS at 11:00

## 2025-02-13 RX ADMIN — ROCURONIUM 50 MG: 50 INJECTION, SOLUTION INTRAVENOUS at 08:04

## 2025-02-13 RX ADMIN — OXYCODONE HYDROCHLORIDE 5 MG: 5 TABLET ORAL at 14:40

## 2025-02-13 RX ADMIN — ACETAMINOPHEN 650 MG: 325 TABLET, FILM COATED ORAL at 20:30

## 2025-02-13 RX ADMIN — REMIFENTANIL HYDROCHLORIDE 0.15 MCG/KG/MIN: 1 INJECTION, POWDER, LYOPHILIZED, FOR SOLUTION INTRAVENOUS at 08:11

## 2025-02-13 RX ADMIN — SCOPOLAMINE 1 PATCH: 1.5 PATCH, EXTENDED RELEASE TRANSDERMAL at 07:31

## 2025-02-13 NOTE — DISCHARGE INSTR - AVS FIRST PAGE
POST-OPERATIVE CARE INSTRUCTION SHEET      ABOUT POST-OPERATIVE CARE VISITS    Post-operative visits are an indispensable part of the surgery, since they help promote healing and prevent persistent or recurrent disease. You should plan on remaining within the [facility] area for at least one day following surgery. You will usually be seen within 7-10 days post-operatively for debridement (removal of crusts from your nose).    You should anticipate 2 office visits over the first 4 weeks after surgery. Continued debridement will be done at these visits, and persistent inflammation or scar tissue will be removed under local anesthesia. Although chances of complications from these manipulations are rare, the potential risks are the same as the surgery itself.     WHAT YOU CAN EXPECT TO EXPERIENCE    You can expect some bleeding from your nose for several days after the surgery and again after each office debridement. When bleeding occurs down the front of your nose or into the back of your throat, you should tilt your head back while sitting up and breathe gently through your nose. If bleeding persists for an extended period of time notify our office.  Over-the-counter Afrin nose spray (oxymetazoline) can be used to open your nose  and reduce bleeding during the first two nights after surgery if needed.    As the sinuses begin to clear themselves, you can expect to have some thick brown drainage from your nose. This is mucus and old blood and does not indicate an infection.    You may experience some discomfort post-operatively due to manipulation and inflammation. Take your pain medication as directed. Often extra-strength Tylenol is sufficient. You may wish to take medication for pain prior to your post-operative visits (particularly early on, when the nose is most sensitive). If the medication is sedating, be sure to have someone available to drive you.      SOME IMPORTANT POST-OPERATIVE DO'S AND DO  NOT'S    Continue Ceftin (antibiotics) until finished. HOLD YOUR DIAMOX until you see Dr. St for follow-up appointment.     DO NOT blow your nose until you have been given permission to do so.    DO NOT bend, lift or strain for at least one week after surgery. These activities will promote bleeding from your nose. You should not plan on participating in rigorous activity until healing is completed.    DO NOT suppress the need to cough or sneeze, but cough/sneeze with your mouth open.    DO NOT resume use of any aspirin-containing products or other blood thinners until after discussing this with us. Typically, you can restart after 7-10 days.    DO use nasal saline spray (without decongestant) every hour while you are awake beginning the day after surgery.  This helps moisten your nose and prevents large crusts from forming.  The preferred brand is Simply Saline due to lack of a preservative which is irritating to some people.    DO use nasal saline irrigation 4-6 times daily beginning on post-operative day three if there is no nasal packing.    DO continue your steroids (Medrol or Prednisone) and antibiotics (if they have been prescribed for you). Diarrhea from antibiotic usage can lead to a serious health problem. This can often be prevented by taking probiotics daily, which is found in yogurt with active cultures or as tablets in a health food store. If you should experience diarrhea, stop the antibiotic and notify us. Further evaluation may be required.    DO notify us for any of the following: temperature elevations above 100.5 F, clear watery drainage from your nose, changes in vision, swelling of the eyes, worsening headache or neck stiffness.                                Contact our office for an emergency or go to the emergency room nearest to you.

## 2025-02-13 NOTE — OP NOTE
OPERATIVE REPORT  PATIENT NAME: Lynda Shaffer    :  1980  MRN: [id number]  Pt Location:  OR ROOM 03    SURGERY DATE: 2025    Surgeons and Role:     * Brenda Stanton MD - Primary  No qualified resient available to assist    Preop Diagnosis:  CSF leak [G96.00]    Post-Op Diagnosis Codes:     * CSF leak [G96.00]    Procedure(s):  LUMBAR  DRAIN PLACEMENT    Specimen(s):  * No specimens in log *    Estimated Blood Loss:   Minimal    Drains:  Lumbar Drain (Active)   Drain Status Open/CSF collection chamber 25 0003   Number of days: 0       Anesthesia Type:   General    Operative Indications:  CSF leak [G96.00]    Operative Findings:  Lumbar drain placed without complications. Good flow from cathter at the end of surgery.    Complications:   None    Patient Disposition:  Relinquished procedure to the ENT team after placement of drain.    Brief history: Patient developed rhinorrhea. ENT plans transnasal endoscopic surgery to repair the leak. I was asked to placed lumbar drain before surgery. I met the patient before surgery and obtained full informed consent for lumbar drain placement. I explained the risks and benefits of the procedure. I explained the risks may include, but not limited to, infection, lumbar drain malfunction requiring replacement, bleeding, spinal hematoma, weakness, numbness, paralysis, bowel and bladdder incontinence, meningitis, ongoing rhinorrhea despite drain placement, etc... The patient consented to the procedure.    Operative report. The patient was positioned in the lateral decubitus position with the left side up.  This was all done after the patient was placed under general anesthesia.  We placed the patient in the fetal position.  We were able to use Betadine prep to prep the entire back.  A timeout was performed with the entire surgical team.  A spinal needle was then used to cannulate the thecal sac.  The lumbar drain was then passed through the needle without  complications.  There was good fluid return through the catheter at the conclusion of the procedure.  The catheter was then hooked up to the [place] trial.  The catheter was secured to the skin with 2-0 Vicryl suture.  At that point I relinquish the procedure to the ENT team to perform the transnasal endoscopic surgery for repair of the patient's rhinorrhea.  There was no complications with this procedure.  She tolerated it very well.  She was hemodynamically stable throughout the entire procedure.    Lisandro Stanton MD                 SIGNATURE: Brenda Stanton MD  DATE: February 13, 2025  TIME: 8:47 AM

## 2025-02-13 NOTE — H&P
H&P - Critical Care/ICU   Name: Lynda Shaffer 44 y.o. female I MRN: [id number]  Unit/Bed#: ICU 08 I Date of Admission: 2/13/2025   Date of Service: 2/13/2025 I Hospital Day: 0       Assessment & Plan   Neuro:   Diagnosis: CSF Leak  10/31/24 CT head/sinus: CT head/sinus demonstrated partially empty sella with a loculated CSF fluid attenuation equivalent area within the posterior aspect of the sphenoid sinus along the anterior and inferior margin of the bony sella turcica  Plan:   S/p endoscopic CSF repair of the sphenoid sinus with lumbar drain placement.    CV:   Diagnosis: HTN on lisinopril 20 mg daily  Plan:    Continue home medication    Pulm:  Diagnosis: None  Plan:   Monitor and maintain SpO2 > 92%    GI:   Diagnosis: History of nausea post-anesthesia  Plan:   Miralax daily  IV Zofran to be given before meals    :   Diagnosis: None  Plan:   Monitor I/O and BMP    F/E/N:   Diagnosis: None  Plan:   Encourage PO intake  Monitor and replenish electrolytes K>4, Phos>3 Mg>2  Nutrition: Regular diet    Heme/Onc:   Diagnosis: None  Plan:   Monitor CBC and transfuse <7    Endo:   Diagnosis: None  Plan:   Monitor blood glucose    ID:   Diagnosis: None  Plan:   Monitor vitals and CBC    MSK/Skin:   Diagnosis: Rosacea  Uses Rhofade 1% for flares  Plan:   Use formulary medication if needed  OOB as tolerated  Disposition: Critical care    History of Present Illness   Lynda Shaffer is a 44 y.o. who presents to [facility] for scheduled endoscopic CSF repair of the sphenoid sinus with lumbar drain placement. Patient states she had 1 year of fluid leak from L nare. She had tried several different measures which did not help, prompting her to seek medical attention. CT head/sinus demonstrated partially empty sella with a loculated CSF fluid attenuation equivalent area within the posterior aspect of the sphenoid sinus along the anterior and inferior margin of the bony sella turcica. Patient does not recall trauma or inciting factor,  however reports her mother had a CSF leak that was not investigated.     History obtained from chart review and the patient.  Review of Systems: See HPI for Review of Systems    Historical Information   Past Medical History:  No date: Allergic  No date: Anxiety  No date: GERD (gastroesophageal reflux disease)  No date: Hypertension  No date: Ovarian cyst  No date: PONV (postoperative nausea and vomiting)  No date: Sinus headache      Comment:  probably from elevated BP  No date: Sleep disorder Past Surgical History:  No date: ANKLE SURGERY; Right      Comment:  1998, 1999  No date: MOLE REMOVAL      Comment:  from neck; benign  No date: OOPHORECTOMY; Right  No date: OVARIAN CYST REMOVAL  No date: TUBAL LIGATION  2012: WISDOM TOOTH EXTRACTION      Comment:  x2  2013: WRIST SURGERY; Right   Current Outpatient Medications   Medication Instructions    lisinopril (ZESTRIL) 20 mg, Oral, Daily    Rhofade 1 % CREA Topical, Daily PRN    zinc gluconate 50 mg, Daily    Allergies   Allergen Reactions    Levofloxacin Other (See Comments)     Reaction Date: 24Jun2009; tendon pain      Social History     Tobacco Use    Smoking status: Never     Passive exposure: Never    Smokeless tobacco: Never   Vaping Use    Vaping status: Never Used   Substance Use Topics    Alcohol use: Yes     Comment: occasional-monthly    Drug use: Never    Family History   Problem Relation Age of Onset    No Known Problems Mother     Skin cancer Father     No Known Problems Daughter     No Known Problems Maternal Grandmother     No Known Problems Maternal Grandfather     No Known Problems Paternal Grandmother     No Known Problems Paternal Grandfather     Breast cancer Maternal Aunt 50    No Known Problems Brother     No Known Problems Brother     No Known Problems Son     No Known Problems Maternal Uncle     No Known Problems Maternal Uncle     No Known Problems Maternal Uncle     No Known Problems Maternal Uncle     No Known Problems Paternal Aunt      No Known Problems Paternal Aunt     No Known Problems Paternal Uncle           Objective :                   Vitals I/O      Most Recent Min/Max in 24hrs   Temp 98.9 °F (37.2 °C) Temp  Min: 97.2 °F (36.2 °C)  Max: 99.5 °F (37.5 °C)   Pulse 82 Pulse  Min: 80  Max: 90   Resp 15 Resp  Min: 15  Max: 25   /83 BP  Min: 104/53  Max: 147/70   O2 Sat 97 % SpO2  Min: 96 %  Max: 99 %      Intake/Output Summary (Last 24 hours) at 2/13/2025 1635  Last data filed at 2/13/2025 1209  Gross per 24 hour   Intake 1700 ml   Output --   Net 1700 ml       Diet Regular; Regular House    Invasive Monitoring           Physical Exam   Physical Exam  Eyes:      Pupils: Pupils are equal, round, and reactive to light.   Skin:     General: Skin is warm.   Cardiovascular:      Rate and Rhythm: Normal rate and regular rhythm.      Pulses: Normal pulses.   Abdominal:      Palpations: Abdomen is soft.   Constitutional:       Appearance: She is well-developed.   Pulmonary:      Effort: Pulmonary effort is normal.      Breath sounds: Normal breath sounds.   Neurological:      General: No focal deficit present.      Mental Status: She is alert and oriented to person, place and time.      Motor: Strength full and intact in all extremities.          Diagnostic Studies        Lab Results: I have reviewed the following results:     Medications:  Scheduled PRN   acetaZOLAMIDE, 500 mg, Q12H KALEY  cefuroxime, 500 mg, Q12H KALEY  docusate sodium, 100 mg, BID  heparin (porcine), 5,000 Units, Q8H KALEY  lisinopril, 20 mg, HS  [START ON 2/14/2025] polyethylene glycol, 17 g, Daily  scopolamine, 1 patch, Q72H      acetaminophen, 650 mg, Q6H PRN  oxyCODONE, 5 mg, Q6H PRN       Continuous          Labs:   CBC    Recent Labs     02/13/25  1523        BMP    No recent results    Coags    No recent results     Additional Electrolytes  No recent results       Blood Gas    No recent results  No recent results LFTs  No recent results    Infectious  No recent  results  Glucose  No recent results

## 2025-02-13 NOTE — QUICK NOTE
PM Rounding:     Patient was seen and examined at bedside. Had episode of vomiting and nose bleeding that resolved. Eating and drinking well.     No excessive bleeding or clear drainage from nose since vomiting episode.   Denies salty taste    Lumbar drain has yet to produce CSF-likely low pressure due to CSF loss intra-op    Will continue to follow      Julia Hunt, PGY3  Otolaryngology- Head and Neck Surgery

## 2025-02-13 NOTE — H&P
Patient: Lynda Switchback  26  History and Physical  MRN: [id number]  : 82     IMPRESSION/PLAN:     Evidence of possible site of CSF leak posterior aspect of the sphenoid sinus along the margin of the sella turcica as per ENT team. Nonspecific loculated CSF fluid equivalent signal intensity structure within the sphenoid sinus and unchanged when compared to head CT 2016. 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     I was asked to assist with lumbar drain placement. I had extensive d/w patient this AM about the risks and benefits of the procedure. I explained the risks of the procedure could include, but are not limited to, weakness, numbness, meningitis, lumbar drain malfunction requiring replacement, bowel or bladder incontinence, spinal hematoma requiring surgery, damage to the spinal cord, as well as the fact that this may not assist with repair of the CSF leak.  If that is the case, the patient may require additional procedures to correct.  I obtained a full informed consent this morning from the patient.  I answered all of her questions.  I will assist ENT this morning with the placement of the lumbar drain.     Lisandro Stanton MD    Subjective/Objective     Chief Complaint    CSF leak/Rhinorrhea    HPI    HPI      44-year-old woman who presents for further evaluation of leakage from the left side of her nose.  The patient states that it started around February and March.  Whenever she leaned over to tie her shoes in the morning especially fluid would come out of her nose.  This is specifically the left side.  It is pretty watery and salty.  At 1 point she had to put a paper towel to plug it up.  She believes that she can collect it.  It is not discolored.  She has been having some headaches since that time as well.  She states that the headaches can be quite severe at times  lasting days.  She has tried multiple allergy medicines as well as Flonase nasal spray.  She did have a CT scan that this showed a sinus cyst but not much evidence of skull base defect.  No other major issues at this time.    VY MCCLELLAN personally reviewed and updated.    Review of Systems     10-point ROS performed.  Patient denies fevers or chills.  All other systems reviewed and found to be negative unless otherwise noted in the HPI or MA note.     Family History    Family History   Problem Relation Age of Onset    No Known Problems Mother     Skin cancer Father     No Known Problems Daughter     No Known Problems Maternal Grandmother     No Known Problems Maternal Grandfather     No Known Problems Paternal Grandmother     No Known Problems Paternal Grandfather     Breast cancer Maternal Aunt 50    No Known Problems Brother     No Known Problems Brother     No Known Problems Son     No Known Problems Maternal Uncle     No Known Problems Maternal Uncle     No Known Problems Maternal Uncle     No Known Problems Maternal Uncle     No Known Problems Paternal Aunt     No Known Problems Paternal Aunt     No Known Problems Paternal Uncle        Social History    Social History     Socioeconomic History    Marital status: /Civil Union     Spouse name: Not on file    Number of children: Not on file    Years of education: Not on file    Highest education level: Not on file   Occupational History    Occupation: Homemaker   Tobacco Use    Smoking status: Never     Passive exposure: Never    Smokeless tobacco: Never   Vaping Use    Vaping status: Never Used   Substance and Sexual Activity    Alcohol use: Yes     Comment: occasional-monthly    Drug use: Never    Sexual activity: Not on file   Other Topics Concern    Not on file   Social History Narrative    · Most recent tobacco use screenin2020      · Do you currently or have you served in the [employer]:   No      · Were you activated, into active duty,  as a member of the [employer] or as a Reservist:   No      · Diet:   Regular      · Sexual orientation:   Heterosexual      · General stress level:   Medium     · Caffeine intake:   Heavy      · Seat belts used routinely:   Yes      · Smoke alarm in home:   Yes      · Sunscreen used routinely:   Yes      · Advance directive:   No      · Live alone or with others:   with others    · Are there stairs in your home:   No     Social Drivers of Health     Financial Resource Strain: Not on file   Food Insecurity: Not on file   Transportation Needs: Not on file   Physical Activity: Not on file   Stress: Not on file   Social Connections: Not on file   Intimate Partner Violence: Not on file   Housing Stability: Not on file       Past Medical History    Past Medical History:   Diagnosis Date    Allergic     Anxiety     GERD (gastroesophageal reflux disease)     Hypertension     Ovarian cyst     PONV (postoperative nausea and vomiting)     Sinus headache     probably from elevated BP    Sleep disorder        Surgical History    Past Surgical History:   Procedure Laterality Date    ANKLE SURGERY Right     1998, 1999    MOLE REMOVAL      from neck; benign    OOPHORECTOMY Right     OVARIAN CYST REMOVAL      TUBAL LIGATION      WISDOM TOOTH EXTRACTION  2012    x2    WRIST SURGERY Right 2013       Medications      Current Facility-Administered Medications:     acetaminophen (TYLENOL) tablet 650 mg, 650 mg, Oral, Q6H PRN, Julia Hunt MD    ceFAZolin (ANCEF) IVPB (premix in dextrose) 2,000 mg 50 mL, 2,000 mg, Intravenous, Once, Julia Hunt MD    ibuprofen (MOTRIN) oral suspension 400 mg, 400 mg, Oral, Q6H PRN, Julia Hunt MD    oxyCODONE (ROXICODONE) IR tablet 5 mg, 5 mg, Oral, Q6H PRN, Julia Hunt MD    scopolamine (TRANSDERM-SCOP) 1 mg/3 days TD 72 hr patch 1 patch, 1 patch, Transdermal, Q72H, Dandre Batista MD    Allergies    Allergies   Allergen Reactions    Levofloxacin Other (See  "Comments)     Reaction Date: 24Jun2009; tendon pain       The following portions of the patient's history were reviewed and updated as appropriate: allergies, current medications, past family history, past medical history, past social history, past surgical history, and problem list.    Investigations    I personally reviewed the MRI brain/sinus 1/10/25 results with the patient:    Evidence of possible site of CSF leak posterior aspect of the sphenoid sinus along the margin of the sella turcica as discussed above.  Nonspecific loculated CSF fluid equivalent signal intensity structure within the sphenoid sinus detailed above and   unchanged when compared to head CT 12/1/2016. 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     Mild  altered signal involving white matter discussed above is a nonspecific finding most often relating to chronic small vessel white matter ischemic disease.    Physical Exam    Vitals:  Blood pressure 144/78, pulse 80, temperature (!) 97.2 °F (36.2 °C), temperature source Temporal, resp. rate 18, height 5' 2\" (1.575 m), weight 73.5 kg (162 lb).,Body mass index is 29.63 kg/m².    Physical Exam  General appearance: alert, appears stated age, cooperative and no distress  Head: Normocephalic, without obvious abnormality, atraumatic. No current rhinorrhea.  Eyes: EOMI, PERRL  Neck: supple, symmetrical, trachea midline and NT  Back: no kyphosis present, no tenderness to percussion or palpation  Lungs: non labored breathing  Heart: regular heart rate, no pitting edema  Abdomen: Soft, nontender, nondistended. No pain to palpation  Neurologic:   Mental status: Alert, oriented x 3, thought content appropriate  Cranial nerves: grossly intact (Cranial nerves II-XII)  Sensory: normal to LT  Motor: moving all extremities without focal weakness   Reflexes: 2+ and " symmetric  Coordination: finger to nose normal bilaterally, no drift bilaterally       Brenda Stanton MD

## 2025-02-13 NOTE — PROGRESS NOTES
Patient is here for follow up for wound check. The incision is healing very nicely. No leakage. She is on po clindomycin and has a few days left.     Exam:    Alert, oriented, fluent  EOMI  MAEW 5/5  Incision well healed. -steristrips removed.    A/P    Patient doing well. No complaints. Preop pain resolved.  Will order another 5 days of clindamycin.  F/U early March at already scheduled appointment.    Lisandro Stanton

## 2025-02-13 NOTE — INTERVAL H&P NOTE
H&P reviewed. After examining the patient I find no changes in the patients condition since the H&P had been written.    Vitals:    02/13/25 0654   BP: 144/78   Pulse: 80   Resp: 18   Temp: (!) 97.2 °F (36.2 °C)       CV: RRR  Lungs: clear b/l   Abd: nttp  MSK: sink intact, FOLEY

## 2025-02-13 NOTE — ANESTHESIA POSTPROCEDURE EVALUATION
Post-Op Assessment Note    CV Status:  Stable  Pain Score: 0    Pain management: adequate       Mental Status:  Alert and awake   Hydration Status:  Euvolemic   PONV Controlled:  Controlled   Airway Patency:  Patent     Post Op Vitals Reviewed: Yes    No anethesia notable event occurred.    Staff: CRNA           Last Filed PACU Vitals:  Vitals Value Taken Time   Temp 97.3    Pulse 84 02/13/25 1222   /53 02/13/25 1222   Resp 17 02/13/25 1222   SpO2 99 % 02/13/25 1221   Vitals shown include unfiled device data.

## 2025-02-13 NOTE — ANESTHESIA POSTPROCEDURE EVALUATION
Post-Op Assessment Note    Last Filed PACU Vitals:  Vitals Value Taken Time   Temp 98.6 °F (37 °C) 02/13/25 1400   Pulse 88 02/13/25 1407   /80 02/13/25 1403   Resp 22 02/13/25 1407   SpO2 97 % 02/13/25 1407   Vitals shown include unfiled device data.    Modified Radha:     Vitals Value Taken Time   Activity 2 02/13/25 1330   Respiration 2 02/13/25 1330   Circulation 2 02/13/25 1330   Consciousness 2 02/13/25 1330   Oxygen Saturation 2 02/13/25 1330     Modified Radha Score: 10

## 2025-02-14 LAB
ANION GAP SERPL CALCULATED.3IONS-SCNC: 7 MMOL/L (ref 4–13)
BASOPHILS # BLD AUTO: 0.01 THOUSANDS/ΜL (ref 0–0.1)
BASOPHILS NFR BLD AUTO: 0 % (ref 0–1)
BUN SERPL-MCNC: 10 MG/DL (ref 5–25)
CALCIUM SERPL-MCNC: 8.8 MG/DL (ref 8.4–10.2)
CHLORIDE SERPL-SCNC: 112 MMOL/L (ref 96–108)
CO2 SERPL-SCNC: 20 MMOL/L (ref 21–32)
CREAT SERPL-MCNC: 0.71 MG/DL (ref 0.6–1.3)
EOSINOPHIL # BLD AUTO: 0 THOUSAND/ΜL (ref 0–0.61)
EOSINOPHIL NFR BLD AUTO: 0 % (ref 0–6)
ERYTHROCYTE [DISTWIDTH] IN BLOOD BY AUTOMATED COUNT: 13 % (ref 11.6–15.1)
GFR SERPL CREATININE-BSD FRML MDRD: 104 ML/MIN/1.73SQ M
GLUCOSE SERPL-MCNC: 94 MG/DL (ref 65–140)
HCT VFR BLD AUTO: 36 % (ref 34.8–46.1)
HGB BLD-MCNC: 11.9 G/DL (ref 11.5–15.4)
IMM GRANULOCYTES # BLD AUTO: 0.03 THOUSAND/UL (ref 0–0.2)
IMM GRANULOCYTES NFR BLD AUTO: 0 % (ref 0–2)
LYMPHOCYTES # BLD AUTO: 1.87 THOUSANDS/ΜL (ref 0.6–4.47)
LYMPHOCYTES NFR BLD AUTO: 23 % (ref 14–44)
MAGNESIUM SERPL-MCNC: 2.4 MG/DL (ref 1.9–2.7)
MCH RBC QN AUTO: 28.9 PG (ref 26.8–34.3)
MCHC RBC AUTO-ENTMCNC: 33.1 G/DL (ref 31.4–37.4)
MCV RBC AUTO: 87 FL (ref 82–98)
MONOCYTES # BLD AUTO: 0.67 THOUSAND/ΜL (ref 0.17–1.22)
MONOCYTES NFR BLD AUTO: 8 % (ref 4–12)
NEUTROPHILS # BLD AUTO: 5.65 THOUSANDS/ΜL (ref 1.85–7.62)
NEUTS SEG NFR BLD AUTO: 69 % (ref 43–75)
NRBC BLD AUTO-RTO: 0 /100 WBCS
PHOSPHATE SERPL-MCNC: 2.6 MG/DL (ref 2.7–4.5)
PLATELET # BLD AUTO: 246 THOUSANDS/UL (ref 149–390)
PMV BLD AUTO: 10.4 FL (ref 8.9–12.7)
POTASSIUM SERPL-SCNC: 3.7 MMOL/L (ref 3.5–5.3)
RBC # BLD AUTO: 4.12 MILLION/UL (ref 3.81–5.12)
SODIUM SERPL-SCNC: 139 MMOL/L (ref 135–147)
WBC # BLD AUTO: 8.23 THOUSAND/UL (ref 4.31–10.16)

## 2025-02-14 PROCEDURE — 83735 ASSAY OF MAGNESIUM: CPT

## 2025-02-14 PROCEDURE — 85025 COMPLETE CBC W/AUTO DIFF WBC: CPT

## 2025-02-14 PROCEDURE — 80048 BASIC METABOLIC PNL TOTAL CA: CPT

## 2025-02-14 PROCEDURE — 99232 SBSQ HOSP IP/OBS MODERATE 35: CPT | Performed by: PHYSICIAN ASSISTANT

## 2025-02-14 PROCEDURE — 93005 ELECTROCARDIOGRAM TRACING: CPT

## 2025-02-14 PROCEDURE — 84100 ASSAY OF PHOSPHORUS: CPT

## 2025-02-14 PROCEDURE — 99233 SBSQ HOSP IP/OBS HIGH 50: CPT | Performed by: INTERNAL MEDICINE

## 2025-02-14 RX ORDER — MAGNESIUM SULFATE HEPTAHYDRATE 40 MG/ML
2 INJECTION, SOLUTION INTRAVENOUS ONCE
Status: COMPLETED | OUTPATIENT
Start: 2025-02-14 | End: 2025-02-14

## 2025-02-14 RX ORDER — METOCLOPRAMIDE HYDROCHLORIDE 5 MG/ML
10 INJECTION INTRAMUSCULAR; INTRAVENOUS EVERY 8 HOURS SCHEDULED
Status: DISCONTINUED | OUTPATIENT
Start: 2025-02-14 | End: 2025-02-15

## 2025-02-14 RX ORDER — ACETAMINOPHEN 10 MG/ML
1000 INJECTION, SOLUTION INTRAVENOUS EVERY 6 HOURS SCHEDULED
Status: COMPLETED | OUTPATIENT
Start: 2025-02-14 | End: 2025-02-15

## 2025-02-14 RX ORDER — METOCLOPRAMIDE HYDROCHLORIDE 5 MG/ML
10 INJECTION INTRAMUSCULAR; INTRAVENOUS EVERY 6 HOURS PRN
Status: DISCONTINUED | OUTPATIENT
Start: 2025-02-14 | End: 2025-02-14

## 2025-02-14 RX ORDER — BUTALBITAL, ACETAMINOPHEN AND CAFFEINE 50; 325; 40 MG/1; MG/1; MG/1
1 TABLET ORAL ONCE
Status: COMPLETED | OUTPATIENT
Start: 2025-02-14 | End: 2025-02-14

## 2025-02-14 RX ORDER — ACETAMINOPHEN 325 MG/1
975 TABLET ORAL EVERY 8 HOURS PRN
Status: DISCONTINUED | OUTPATIENT
Start: 2025-02-14 | End: 2025-02-14

## 2025-02-14 RX ORDER — DEXTROSE, SODIUM CHLORIDE, SODIUM LACTATE, POTASSIUM CHLORIDE, AND CALCIUM CHLORIDE 5; .6; .31; .03; .02 G/100ML; G/100ML; G/100ML; G/100ML; G/100ML
100 INJECTION, SOLUTION INTRAVENOUS CONTINUOUS
Status: DISPENSED | OUTPATIENT
Start: 2025-02-14 | End: 2025-02-14

## 2025-02-14 RX ORDER — DROPERIDOL 2.5 MG/ML
0.62 INJECTION, SOLUTION INTRAMUSCULAR; INTRAVENOUS ONCE
Status: COMPLETED | OUTPATIENT
Start: 2025-02-14 | End: 2025-02-14

## 2025-02-14 RX ORDER — HYDROMORPHONE HCL IN WATER/PF 6 MG/30 ML
0.2 PATIENT CONTROLLED ANALGESIA SYRINGE INTRAVENOUS EVERY 2 HOUR PRN
Status: DISCONTINUED | OUTPATIENT
Start: 2025-02-14 | End: 2025-02-14

## 2025-02-14 RX ORDER — ACETAMINOPHEN 325 MG/1
975 TABLET ORAL EVERY 8 HOURS SCHEDULED
Status: DISCONTINUED | OUTPATIENT
Start: 2025-02-14 | End: 2025-02-14

## 2025-02-14 RX ORDER — KETOROLAC TROMETHAMINE 30 MG/ML
15 INJECTION, SOLUTION INTRAMUSCULAR; INTRAVENOUS ONCE
Status: COMPLETED | OUTPATIENT
Start: 2025-02-14 | End: 2025-02-14

## 2025-02-14 RX ORDER — OXYCODONE HYDROCHLORIDE 5 MG/1
5 TABLET ORAL EVERY 4 HOURS PRN
Refills: 0 | Status: DISCONTINUED | OUTPATIENT
Start: 2025-02-14 | End: 2025-02-15

## 2025-02-14 RX ORDER — SODIUM CHLORIDE 9 MG/ML
100 INJECTION, SOLUTION INTRAVENOUS ONCE
Status: COMPLETED | OUTPATIENT
Start: 2025-02-14 | End: 2025-02-14

## 2025-02-14 RX ORDER — HYDROMORPHONE HCL/PF 1 MG/ML
0.5 SYRINGE (ML) INJECTION ONCE
Status: COMPLETED | OUTPATIENT
Start: 2025-02-14 | End: 2025-02-14

## 2025-02-14 RX ORDER — DIPHENHYDRAMINE HYDROCHLORIDE 50 MG/ML
25 INJECTION INTRAMUSCULAR; INTRAVENOUS EVERY 8 HOURS SCHEDULED
Status: DISCONTINUED | OUTPATIENT
Start: 2025-02-14 | End: 2025-02-15

## 2025-02-14 RX ADMIN — ONDANSETRON 4 MG: 2 INJECTION INTRAMUSCULAR; INTRAVENOUS at 16:08

## 2025-02-14 RX ADMIN — OXYCODONE HYDROCHLORIDE 5 MG: 5 TABLET ORAL at 07:30

## 2025-02-14 RX ADMIN — DOCUSATE SODIUM 100 MG: 100 CAPSULE, LIQUID FILLED ORAL at 17:29

## 2025-02-14 RX ADMIN — CEFUROXIME AXETIL 500 MG: 250 TABLET, FILM COATED ORAL at 07:46

## 2025-02-14 RX ADMIN — ACETAZOLAMIDE 500 MG: 250 TABLET ORAL at 09:59

## 2025-02-14 RX ADMIN — ONDANSETRON 4 MG: 2 INJECTION INTRAMUSCULAR; INTRAVENOUS at 11:04

## 2025-02-14 RX ADMIN — ACETAMINOPHEN 1000 MG: 10 INJECTION INTRAVENOUS at 11:04

## 2025-02-14 RX ADMIN — DEXTROSE, SODIUM CHLORIDE, SODIUM LACTATE, POTASSIUM CHLORIDE, AND CALCIUM CHLORIDE 100 ML/HR: 5; .6; .31; .03; .02 INJECTION, SOLUTION INTRAVENOUS at 10:08

## 2025-02-14 RX ADMIN — ACETAMINOPHEN 650 MG: 325 TABLET, FILM COATED ORAL at 02:24

## 2025-02-14 RX ADMIN — HYDROMORPHONE HYDROCHLORIDE 0.2 MG: 0.2 INJECTION, SOLUTION INTRAMUSCULAR; INTRAVENOUS; SUBCUTANEOUS at 12:43

## 2025-02-14 RX ADMIN — POLYETHYLENE GLYCOL 3350 17 G: 17 POWDER, FOR SOLUTION ORAL at 08:27

## 2025-02-14 RX ADMIN — CAFFEINE AND SODIUM BENZOATE 500 MG: 125 INJECTION, SOLUTION INTRAMUSCULAR; INTRAVENOUS at 17:30

## 2025-02-14 RX ADMIN — METOCLOPRAMIDE 10 MG: 5 INJECTION, SOLUTION INTRAMUSCULAR; INTRAVENOUS at 17:29

## 2025-02-14 RX ADMIN — DROPERIDOL 0.62 MG: 2.5 INJECTION, SOLUTION INTRAMUSCULAR; INTRAVENOUS at 08:50

## 2025-02-14 RX ADMIN — OXYCODONE HYDROCHLORIDE 5 MG: 5 TABLET ORAL at 02:24

## 2025-02-14 RX ADMIN — SODIUM CHLORIDE 100 ML/HR: 0.9 INJECTION, SOLUTION INTRAVENOUS at 17:12

## 2025-02-14 RX ADMIN — HYDROMORPHONE HYDROCHLORIDE 0.2 MG: 0.2 INJECTION, SOLUTION INTRAMUSCULAR; INTRAVENOUS; SUBCUTANEOUS at 08:27

## 2025-02-14 RX ADMIN — HYDROMORPHONE HYDROCHLORIDE 0.5 MG: 1 INJECTION, SOLUTION INTRAMUSCULAR; INTRAVENOUS; SUBCUTANEOUS at 03:23

## 2025-02-14 RX ADMIN — BUTALBITAL, ACETAMINOPHEN AND CAFFEINE 1 TABLET: 50; 325; 40 TABLET ORAL at 13:48

## 2025-02-14 RX ADMIN — DIPHENHYDRAMINE HYDROCHLORIDE 25 MG: 50 INJECTION, SOLUTION INTRAMUSCULAR; INTRAVENOUS at 17:29

## 2025-02-14 RX ADMIN — MAGNESIUM SULFATE HEPTAHYDRATE 2 G: 40 INJECTION, SOLUTION INTRAVENOUS at 03:08

## 2025-02-14 RX ADMIN — KETOROLAC TROMETHAMINE 15 MG: 30 INJECTION, SOLUTION INTRAMUSCULAR at 16:08

## 2025-02-14 RX ADMIN — CEFUROXIME AXETIL 500 MG: 250 TABLET, FILM COATED ORAL at 17:29

## 2025-02-14 RX ADMIN — ONDANSETRON 4 MG: 2 INJECTION INTRAMUSCULAR; INTRAVENOUS at 07:30

## 2025-02-14 RX ADMIN — ACETAMINOPHEN 1000 MG: 10 INJECTION INTRAVENOUS at 17:29

## 2025-02-14 RX ADMIN — DOCUSATE SODIUM 100 MG: 100 CAPSULE, LIQUID FILLED ORAL at 08:27

## 2025-02-14 NOTE — UTILIZATION REVIEW
Initial Clinical Review    Elective Inpatient surgical procedure  Age/Sex: 44 y.o. female  Surgery Date: 2/13  Procedure: S/p LUMBAR  DRAIN PLACEMENT  Anesthesia: General    POD#1 Progress Note:  2/14  Lumbar Drain at 10 cc/hr. Diamox Bid. Hold home meds.  Monitor and maintain SpO2 >92%. IV Droperidol 0.625mg x 1.  Monitor Cbc and transfuse <7. Po antibiotics. Pain control.   Pt c/o headache. Same headache prior to surgery, however the pain has worsened since surgery.  Reports headache is at the top and front of her head as well as in her neck. + nausea. Notes nausea in the past with oxycodone.    Per ENT; Bed rest with bathroom privileges.  Diamox Bid. Ceftin 500 mg bid. Maintain Lumbar drain 10 cc/hr  Sinus precautions.     Per Neurosurgery; Lumbar Drain in place, 10 cc/hr. Not draining much since surgery but there is some output. Per discussion with ENT, plan to leave in place for now.   Plan to leave in place x3 days. Clamp Sunday, remove Monday.   Continue neuro exam.    Admission Orders: Date/Time/Statement:   Admission Orders (From admission, onward)       Ordered        02/13/25 0711  Inpatient Admission  Once                          Orders Placed This Encounter   Procedures    Inpatient Admission     Standing Status:   Standing     Number of Occurrences:   1     Level of Care:   Critical Care [15]     Estimated length of stay:   More than 2 Midnights     Certification:   I certify that inpatient services are medically necessary for this patient for a duration of greater than two midnights. See H&P and MD Progress Notes for additional information about the patient's course of treatment.     Diet: Regular  Mobility: Bed rest with bathroom privileges  DVT Prophylaxis: SCD    Medications/Pain Control:   Scheduled Medications:  acetaminophen, 1,000 mg, Intravenous, Q6H KALEY  acetaZOLAMIDE, 500 mg, Oral, Q12H KALEY  cefuroxime, 500 mg, Oral, Q12H KALEY  docusate sodium, 100 mg, Oral, BID  [Held by provider] heparin  (porcine), 5,000 Units, Subcutaneous, Q8H KALEY  [Held by provider] lisinopril, 20 mg, Oral, HS  ondansetron, 4 mg, Intravenous, TID AC  polyethylene glycol, 17 g, Oral, Daily  scopolamine, 1 patch, Transdermal, Q72H      Continuous IV Infusions:  dextrose 5% lactated ringer's, 100 mL/hr, Intravenous, Continuous      PRN Meds:  HYDROmorphone, 0.2 mg, Intravenous, Q2H PRN 2/14 x1  oxyCODONE, 2.5 mg, Oral, Q4H PRN   Or  oxyCODONE, 5 mg, Oral, Q4H PRN 2/13 x2, 10/14 x2      Vital Signs (last 3 days)       Date/Time Temp Pulse Resp BP MAP (mmHg) SpO2 O2 Device Cardiac (WDL) Patient Position - Orthostatic VS [place] Coma Scale Score Pain    02/14/25 1000 97.6 °F (36.4 °C) 76 28 98/56 66 98 % -- -- Lying -- --    02/14/25 0832 -- 70 17 137/77 95 96 % None (Room air) -- -- -- --    02/14/25 0819 -- 92 35 137/77 95 98 % -- -- -- -- 10 - Worst Possible Pain    02/14/25 0800 97.3 °F (36.3 °C) 72 23 110/63 74 98 % None (Room air) -- Lying 15 10 - Worst Possible Pain    02/14/25 0730 -- -- -- -- -- -- -- -- -- -- 8    02/14/25 0700 -- 78 19 100/60 74 98 % -- -- -- -- --    02/14/25 0600 -- 76 21 96/56 69 97 % -- -- -- -- --    02/14/25 0500 -- 80 22 99/54 70 94 % -- -- -- -- --    02/14/25 0400 97.6 °F (36.4 °C) 90 23 103/62 77 98 % -- -- -- 15 --    02/14/25 0323 -- -- -- -- -- -- -- -- -- -- 10 - Worst Possible Pain    02/14/25 0300 97.9 °F (36.6 °C) 84 20 128/71 91 97 % -- -- -- -- --    02/14/25 0224 -- -- -- -- -- -- -- -- -- -- 10 - Worst Possible Pain    02/14/25 0200 -- 92 18 116/69 87 97 % -- -- -- -- --    02/14/25 0100 -- 88 21 108/68 86 96 % -- -- -- -- --    02/14/25 0000 98.1 °F (36.7 °C) 92 18 107/60 76 96 % -- -- -- 15 --    02/13/25 2300 -- 88 15 106/66 84 96 % -- -- -- -- --    02/13/25 2200 -- 92 19 110/66 75 96 % -- -- -- -- --    02/13/25 2100 -- 90 16 114/76 89 97 % -- -- -- -- --    02/13/25 2052 -- -- -- -- -- -- -- -- -- -- 8    02/13/25 2030 -- -- -- -- -- -- -- -- -- -- 8 02/13/25 2000 -- -- -- -- --  -- -- -- -- 15 2    02/13/25 1800 98.5 °F (36.9 °C) 70 18 112/56 74 97 % -- -- Lying -- --    02/13/25 1719 -- 48 19 -- -- -- -- -- -- -- --    02/13/25 1718 -- -- -- 112/56 74 -- -- -- Lying -- --    02/13/25 1700 -- 80 14 137/74 109 97 % -- -- Lying -- --    02/13/25 1600 98.9 °F (37.2 °C) 82 15 145/83 100 97 % None (Room air) -- Lying 15 --    02/13/25 1500 98.5 °F (36.9 °C) 74 15 134/85 101 96 % -- -- Lying -- --    02/13/25 1415 -- -- -- -- -- -- -- -- -- 15 5    02/13/25 1400 98.6 °F (37 °C) 85 18 145/70 100 96 % None (Room air) -- Lying -- --    02/13/25 1330 99.5 °F (37.5 °C) 85 18 142/66 97 96 % None (Room air) -- -- 15 5    02/13/25 1316 -- -- -- -- -- -- -- -- -- -- 10 - Worst Possible Pain    02/13/25 1315 -- 80 19 147/70 100 97 % None (Room air) -- -- 15 10 - Worst Possible Pain    02/13/25 1300 -- 90 20 133/65 94 96 % -- -- -- -- --    02/13/25 1251 -- 86 20 143/69 96 97 % None (Room air) -- -- -- 10 - Worst Possible Pain    02/13/25 1245 -- 84 25 143/69 96 97 % None (Room air) -- -- -- --    02/13/25 1230 -- 85 17 104/53 76 98 % None (Room air) -- -- -- --    02/13/25 1222 97.3 °F (36.3 °C) 84 17 131/53 76 99 % None (Room air) WDL -- 15 --    02/13/25 0654 97.2 °F (36.2 °C) 80 18 144/78 -- -- -- -- -- -- 7          Weight (last 2 days)       Date/Time Weight    02/13/25 0654 73.5 (162)            Pertinent Labs/Diagnostic Test Results:   Radiology:  No orders to display     Cardiology:  No orders to display     GI:  No orders to display           Results from last 7 days   Lab Units 02/14/25  0545 02/13/25  1523   WBC Thousand/uL 8.23  --    HEMOGLOBIN g/dL 11.9  --    HEMATOCRIT % 36.0  --    PLATELETS Thousands/uL 246 219   TOTAL NEUT ABS Thousands/µL 5.65  --          Results from last 7 days   Lab Units 02/14/25  0545   SODIUM mmol/L 139   POTASSIUM mmol/L 3.7   CHLORIDE mmol/L 112*   CO2 mmol/L 20*   ANION GAP mmol/L 7   BUN mg/dL 10   CREATININE mg/dL 0.71   EGFR ml/min/1.73sq m 104   CALCIUM  mg/dL 8.8   MAGNESIUM mg/dL 2.4   PHOSPHORUS mg/dL 2.6*             Results from last 7 days   Lab Units 02/14/25  0545   GLUCOSE RANDOM mg/dL 94           Results from last 7 days   Lab Units 02/13/25  1802   TSH 3RD GENERATON uIU/mL 0.571         Network Utilization Review Department  ATTENTION: Please call with any questions or concerns to [phone] and carefully listen to the prompts so that you are directed to the right person. All voicemails are confidential.   For Discharge needs, contact Care Management DC Support Team at [phone] opt. [phone]  Send all requests for admission clinical reviews, approved or denied determinations and any other requests to dedicated fax number below belonging to the campus where the patient is receiving treatment. List of dedicated fax numbers for the Facilities:  FACILITY NAME UR FAX NUMBER   ADMISSION DENIALS (Administrative/Medical Necessity) [phone]   DISCHARGE SUPPORT TEAM (NETWORK) [phone]   PARENT CHILD HEALTH (Maternity/NICU/Pediatrics) [phone]   [facility] [phone]   [facility] [phone]   [facility] [phone]   [facility] [phone]   [facility] [phone]   [facility] [phone]   [facility] [phone]   [facility] [phone]   [facility] [phone]   [facility] [phone]   [facility] [phone]   [facility] [phone]

## 2025-02-14 NOTE — SEPSIS NOTE
Sepsis Note   Lynda Shaffer 44 y.o. female MRN: [id number]  Unit/Bed#: ICU 08 Encounter: [id number]       Initial Sepsis Screening       Row Name 02/14/25 1420                Is the patient's history suggestive of a new or worsening infection? No  -NM                  User Key  (r) = Recorded By, (t) = Taken By, (c) = Cosigned By      Initials Name Provider Type    NM Valdemar Delong MD Resident                        Body mass index is 29.63 kg/m².  Wt Readings from Last 1 Encounters:   02/13/25 73.5 kg (162 lb)     IBW (Ideal Body Weight): 50.1 kg    Ideal body weight: 50.1 kg (110 lb 7.2 oz)  Adjusted ideal body weight: 59.5 kg (131 lb 1.1 oz)

## 2025-02-14 NOTE — PROGRESS NOTES
"Otolaryngology HN/FPRS Progress Note:    Subjective: Pt POD 1 s/p endonasal repair CSF leak. Doing well. No acute events overnight. No rhinorrhea, no salty/metallic taste in mouth.    Concern overnight that LD not functional.    Objective:   /71   Pulse 84   Temp 97.9 °F (36.6 °C)   Resp 20   Ht 5' 2\" (1.575 m)   Wt 73.5 kg (162 lb)   SpO2 97%   BMI 29.63 kg/m²     Physical Exam   Gen: NAD  HEENT: no masses or lesions, no clear drainage from nose or posterior pharyngeal wall  Lungs: Breathing easily. No stertor or stridor  CV: RRR. Good distal perfusion  Neck: Soft and flat    Assessment/Plan: POD 1 s/p endonasal CSF leak repair - Doing well. Will fu with RN and NSGY regarding concern that LD not draining.   -No acute ENT interventions  -Bed rest with bathroom privileges   - Diamox BID  - Ceftin 500mg BID  -Maintain LD to 10 cc per hour   -Sinus precautions: no nose blowing, no straws, elevate HOB, sneeze with mouth open  -Rest of management per ICU/NSGY    ENT to follow as primary    Dispo: ICU    Karly Nicholson MD PGY-2  [facility] Otolaryngology - Head and Neck Surgery  Available on Epic Secure Chat.  Please contact ENT Resident [employer] chat Role for any questions or concerns.      "

## 2025-02-14 NOTE — PROGRESS NOTES
Progress Note - Neurosurgery   Name: [facility] 44 y.o. female I MRN: [id number]  Unit/Bed#: ICU 08 I Date of Admission: 2/13/2025   Date of Service: 2/14/2025 I Hospital Day: 1     Assessment & Plan  CSF leak  POD 1 endonasal CSF leak repair and placement of lumbar drain (Sivan / Maximino, 2/13/25)    Plan:  Continue to monitor neurological exam  LD in place, 10cc/hr. Not draining much since surgery but there is some output. Per discussion with ENT, plan to leave in place for now.   Plan to leave in place x3 days. Clamp Sunday, remove Monday.   Abx per ENT service  Rest of care per ICU team / ENT  Ok to mobilize with PT/OT  DVT ppx: SCDs, SQH (currently held)    Neurosurgery will continue to follow. Please call with questions or concerns.         Subjective   Patient with NAEO. LD has been working intermittently and not draining 10cc/hr; however per ENT plan to leave in place as it is draining something. She is complaining of HA; no focal deficits.     Objective :  Temp:  [97.3 °F (36.3 °C)-99.5 °F (37.5 °C)] 97.7 °F (36.5 °C)  HR:  [48-92] 58  BP: ()/(54-85) 92/58  Resp:  [14-35] 32  SpO2:  [94 %-99 %] 99 %  O2 Device: None (Room air)    I/O         02/12 0701  02/13 0700 02/13 0701  02/14 0700 02/14 0701  02/15 0700    P.O.  720     I.V. (mL/kg)  2743.3 (37.3) 186.7 (2.5)    IV Piggyback  50 100    Total Intake(mL/kg)  3513.3 (47.8) 286.7 (3.9)    Urine (mL/kg/hr)  1300 (0.7) 1100 (2.4)    Emesis/NG output  0 0    Drains  30 35    Total Output  1330 1135    Net  +2183.3 -848.3           Unmeasured Urine Occurrence  1 x     Unmeasured Emesis Occurrence  1 x 1 x          Physical Exam Neurological Exam    General appearance: alert, appears stated age, cooperative and no distress  Head: Normocephalic, without obvious abnormality, atraumatic  Eyes: EOMI, PERRL  Neck: supple, symmetrical, trachea midline   Back: LD in place. No drainage around drain site.   Lungs: non labored breathing  Heart: regular heart  rate  Neurologic:   Mental status: Alert, oriented, thought content appropriate  Cranial nerves: grossly intact (Cranial nerves II-XII)  Sensory: normal to LT   Motor: moving all extremities without focal weakness       Lab Results: I have reviewed the following results:  Recent Labs     02/14/25  0545   WBC 8.23   HGB 11.9   HCT 36.0      SODIUM 139   K 3.7   *   CO2 20*   BUN 10   CREATININE 0.71   GLUC 94   MG 2.4   PHOS 2.6*             VTE Pharmacologic Prophylaxis: Sequential compression device (Venodyne)

## 2025-02-14 NOTE — PLAN OF CARE
Problem: PAIN - ADULT  Goal: Verbalizes/displays adequate comfort level or baseline comfort level  Description: Interventions:  - Encourage patient to monitor pain and request assistance  - Assess pain using appropriate pain scale  - Administer analgesics based on type and severity of pain and evaluate response  - Implement non-pharmacological measures as appropriate and evaluate response  - Consider cultural and social influences on pain and pain management  - Notify physician/advanced practitioner if interventions unsuccessful or patient reports new pain  Outcome: Progressing     Problem: INFECTION - ADULT  Goal: Absence or prevention of progression during hospitalization  Description: INTERVENTIONS:  - Assess and monitor for signs and symptoms of infection  - Monitor lab/diagnostic results  - Monitor all insertion sites, i.e. indwelling lines, tubes, and drains  - Monitor endotracheal if appropriate and nasal secretions for changes in amount and color  - [place] appropriate cooling/warming therapies per order  - Administer medications as ordered  - Instruct and encourage patient and family to use good hand hygiene technique  - Identify and instruct in appropriate isolation precautions for identified infection/condition  Outcome: Progressing     Problem: SAFETY ADULT  Goal: Patient will remain free of falls  Description: INTERVENTIONS:  - Educate patient/family on patient safety including physical limitations  - Instruct patient to call for assistance with activity   - Consult OT/PT to assist with strengthening/mobility   - Keep Call bell within reach  - Keep bed low and locked with side rails adjusted as appropriate  - Keep care items and personal belongings within reach  - Initiate and maintain comfort rounds  - Make Fall Risk Sign visible to staff  - Offer Toileting every 2 Hours, in advance of need  - Initiate/Maintain bed alarm  - Obtain necessary fall risk management equipment.  - Apply yellow socks and  bracelet for high fall risk patients  - Consider moving patient to room near nurses station  Outcome: Progressing  Goal: Maintain or return to baseline ADL function  Description: INTERVENTIONS:  -  Assess patient's ability to carry out ADLs; assess patient's baseline for ADL function and identify physical deficits which impact ability to perform ADLs (bathing, care of mouth/teeth, toileting, grooming, dressing, etc.)  - Assess/evaluate cause of self-care deficits   - Assess range of motion  - Assess patient's mobility; develop plan if impaired  - Assess patient's need for assistive devices and provide as appropriate  - Encourage maximum independence but intervene and supervise when necessary  - Involve family in performance of ADLs  - Assess for home care needs following discharge   - Consider OT consult to assist with ADL evaluation and planning for discharge  - Provide patient education as appropriate  Outcome: Progressing  Goal: Maintains/Returns to pre admission functional level  Description: INTERVENTIONS:  - Perform AM-PAC 6 Click Basic Mobility/ Daily Activity assessment daily.  - Set and communicate daily mobility goal to care team and patient/family/caregiver.   - Collaborate with rehabilitation services on mobility goals if consulted  - Perform Range of Motion 4 times a day.  - Reposition patient every 2 hours.  - Dangle patient 3 times a day  - Stand patient 3 times a day  - Ambulate patient 3 times a day  - Out of bed to chair 3 times a day   - Out of bed for meals 3 times a day  - Out of bed for toileting  - Record patient progress and toleration of activity level   Outcome: Progressing     Problem: DISCHARGE PLANNING  Goal: Discharge to home or other facility with appropriate resources  Description: INTERVENTIONS:  - Identify barriers to discharge w/patient and caregiver  - Arrange for needed discharge resources and transportation as appropriate  - Identify discharge learning needs (meds, wound care,  etc.)  - Arrange for interpretive services to assist at discharge as needed  - Refer to Case Management Department for coordinating discharge planning if the patient needs post-hospital services based on physician/advanced practitioner order or complex needs related to functional status, cognitive ability, or social support system  Outcome: Progressing     Problem: Knowledge Deficit  Goal: Patient/family/caregiver demonstrates understanding of disease process, treatment plan, medications, and discharge instructions  Description: Complete learning assessment and assess knowledge base.  Interventions:  - Provide teaching at level of understanding  - Provide teaching via preferred learning methods  Outcome: Progressing     Problem: NEUROSENSORY - ADULT  Goal: Achieves stable or improved neurological status  Description: INTERVENTIONS  - Monitor and report changes in neurological status  - Monitor vital signs such as temperature, blood pressure, glucose, and any other labs ordered   - Initiate measures to prevent increased intracranial pressure  - Monitor for seizure activity and implement precautions if appropriate      Outcome: Progressing  Goal: Remains free of injury related to seizures activity  Description: INTERVENTIONS  - Maintain airway, patient safety  and administer oxygen as ordered  - Monitor patient for seizure activity, document and report duration and description of seizure to physician/advanced practitioner  - If seizure occurs,  ensure patient safety during seizure  - Reorient patient post seizure  - Seizure pads on all 4 side rails  - Instruct patient/family to notify RN of any seizure activity including if an aura is experienced  - Instruct patient/family to call for assistance with activity based on nursing assessment  - Administer anti-seizure medications if ordered    Outcome: Progressing  Goal: Achieves maximal functionality and self care  Description: INTERVENTIONS  - Monitor swallowing and  airway patency with patient fatigue and changes in neurological status  - Encourage and assist patient to increase activity and self care.   - Encourage visually impaired, hearing impaired and aphasic patients to use assistive/communication devices  Outcome: Progressing

## 2025-02-14 NOTE — ASSESSMENT & PLAN NOTE
POD 1 endonasal CSF leak repair and placement of lumbar drain (Sivan / Maximino, 2/13/25)    Plan:  Continue to monitor neurological exam  LD in place, 10cc/hr. Not draining much since surgery but there is some output. Per discussion with ENT, plan to leave in place for now.   Plan to leave in place x3 days. Clamp Sunday, remove Monday.   Abx per ENT service  Rest of care per ICU team / ENT  Ok to mobilize with PT/OT  DVT ppx: SCDs, SQH (currently held)    Neurosurgery will continue to follow. Please call with questions or concerns.

## 2025-02-15 LAB
ALBUMIN SERPL BCG-MCNC: 3.7 G/DL (ref 3.5–5)
ALP SERPL-CCNC: 39 U/L (ref 34–104)
ALT SERPL W P-5'-P-CCNC: 31 U/L (ref 7–52)
ANION GAP SERPL CALCULATED.3IONS-SCNC: 8 MMOL/L (ref 4–13)
AST SERPL W P-5'-P-CCNC: 14 U/L (ref 13–39)
ATRIAL RATE: 69 BPM
ATRIAL RATE: 69 BPM
ATRIAL RATE: 72 BPM
BASOPHILS # BLD AUTO: 0.01 THOUSANDS/ΜL (ref 0–0.1)
BASOPHILS NFR BLD AUTO: 0 % (ref 0–1)
BILIRUB SERPL-MCNC: 0.38 MG/DL (ref 0.2–1)
BUN SERPL-MCNC: 9 MG/DL (ref 5–25)
CA-I BLD-SCNC: 1.11 MMOL/L (ref 1.12–1.32)
CALCIUM SERPL-MCNC: 7.7 MG/DL (ref 8.4–10.2)
CHLORIDE SERPL-SCNC: 116 MMOL/L (ref 96–108)
CO2 SERPL-SCNC: 15 MMOL/L (ref 21–32)
CREAT SERPL-MCNC: 0.71 MG/DL (ref 0.6–1.3)
EOSINOPHIL # BLD AUTO: 0.02 THOUSAND/ΜL (ref 0–0.61)
EOSINOPHIL NFR BLD AUTO: 0 % (ref 0–6)
ERYTHROCYTE [DISTWIDTH] IN BLOOD BY AUTOMATED COUNT: 13.2 % (ref 11.6–15.1)
GFR SERPL CREATININE-BSD FRML MDRD: 104 ML/MIN/1.73SQ M
GLUCOSE SERPL-MCNC: 112 MG/DL (ref 65–140)
HCT VFR BLD AUTO: 35.3 % (ref 34.8–46.1)
HGB BLD-MCNC: 11.6 G/DL (ref 11.5–15.4)
IMM GRANULOCYTES # BLD AUTO: 0.03 THOUSAND/UL (ref 0–0.2)
IMM GRANULOCYTES NFR BLD AUTO: 0 % (ref 0–2)
LYMPHOCYTES # BLD AUTO: 1.72 THOUSANDS/ΜL (ref 0.6–4.47)
LYMPHOCYTES NFR BLD AUTO: 22 % (ref 14–44)
MAGNESIUM SERPL-MCNC: 2.8 MG/DL (ref 1.9–2.7)
MCH RBC QN AUTO: 29.1 PG (ref 26.8–34.3)
MCHC RBC AUTO-ENTMCNC: 32.9 G/DL (ref 31.4–37.4)
MCV RBC AUTO: 89 FL (ref 82–98)
MONOCYTES # BLD AUTO: 0.47 THOUSAND/ΜL (ref 0.17–1.22)
MONOCYTES NFR BLD AUTO: 6 % (ref 4–12)
NEUTROPHILS # BLD AUTO: 5.59 THOUSANDS/ΜL (ref 1.85–7.62)
NEUTS SEG NFR BLD AUTO: 72 % (ref 43–75)
NRBC BLD AUTO-RTO: 0 /100 WBCS
P AXIS: 47 DEGREES
P AXIS: 48 DEGREES
P AXIS: 51 DEGREES
PHOSPHATE SERPL-MCNC: 1.4 MG/DL (ref 2.7–4.5)
PLATELET # BLD AUTO: 211 THOUSANDS/UL (ref 149–390)
PMV BLD AUTO: 9.6 FL (ref 8.9–12.7)
POTASSIUM SERPL-SCNC: 3.7 MMOL/L (ref 3.5–5.3)
PR INTERVAL: 175 MS
PR INTERVAL: 192 MS
PR INTERVAL: 196 MS
PROT SERPL-MCNC: 6.1 G/DL (ref 6.4–8.4)
QRS AXIS: -34 DEGREES
QRS AXIS: -42 DEGREES
QRS AXIS: -56 DEGREES
QRSD INTERVAL: 88 MS
QT INTERVAL: 388 MS
QT INTERVAL: 421 MS
QT INTERVAL: 433 MS
QTC INTERVAL: 416 MS
QTC INTERVAL: 461 MS
QTC INTERVAL: 464 MS
RBC # BLD AUTO: 3.99 MILLION/UL (ref 3.81–5.12)
SODIUM SERPL-SCNC: 139 MMOL/L (ref 135–147)
T WAVE AXIS: -1 DEGREES
T WAVE AXIS: -1 DEGREES
T WAVE AXIS: 1 DEGREES
VENTRICULAR RATE: 69 BPM
VENTRICULAR RATE: 69 BPM
VENTRICULAR RATE: 72 BPM
WBC # BLD AUTO: 7.84 THOUSAND/UL (ref 4.31–10.16)

## 2025-02-15 PROCEDURE — 93005 ELECTROCARDIOGRAM TRACING: CPT

## 2025-02-15 PROCEDURE — 85025 COMPLETE CBC W/AUTO DIFF WBC: CPT

## 2025-02-15 PROCEDURE — 99233 SBSQ HOSP IP/OBS HIGH 50: CPT | Performed by: INTERNAL MEDICINE

## 2025-02-15 PROCEDURE — 80053 COMPREHEN METABOLIC PANEL: CPT

## 2025-02-15 PROCEDURE — 99232 SBSQ HOSP IP/OBS MODERATE 35: CPT | Performed by: STUDENT IN AN ORGANIZED HEALTH CARE EDUCATION/TRAINING PROGRAM

## 2025-02-15 PROCEDURE — 84100 ASSAY OF PHOSPHORUS: CPT

## 2025-02-15 PROCEDURE — 82330 ASSAY OF CALCIUM: CPT

## 2025-02-15 PROCEDURE — 83735 ASSAY OF MAGNESIUM: CPT

## 2025-02-15 PROCEDURE — 93010 ELECTROCARDIOGRAM REPORT: CPT | Performed by: INTERNAL MEDICINE

## 2025-02-15 RX ORDER — LISINOPRIL 20 MG/1
20 TABLET ORAL DAILY
Status: DISCONTINUED | OUTPATIENT
Start: 2025-02-15 | End: 2025-02-17 | Stop reason: HOSPADM

## 2025-02-15 RX ORDER — DOCUSATE SODIUM 100 MG/1
100 CAPSULE, LIQUID FILLED ORAL DAILY
Status: DISCONTINUED | OUTPATIENT
Start: 2025-02-16 | End: 2025-02-15

## 2025-02-15 RX ORDER — HEPARIN SODIUM 5000 [USP'U]/ML
5000 INJECTION, SOLUTION INTRAVENOUS; SUBCUTANEOUS EVERY 8 HOURS SCHEDULED
Status: DISCONTINUED | OUTPATIENT
Start: 2025-02-16 | End: 2025-02-16

## 2025-02-15 RX ORDER — ACETAMINOPHEN 10 MG/ML
1000 INJECTION, SOLUTION INTRAVENOUS EVERY 6 HOURS SCHEDULED
Status: DISCONTINUED | OUTPATIENT
Start: 2025-02-15 | End: 2025-02-15

## 2025-02-15 RX ORDER — SODIUM CHLORIDE, SODIUM GLUCONATE, SODIUM ACETATE, POTASSIUM CHLORIDE, MAGNESIUM CHLORIDE, SODIUM PHOSPHATE, DIBASIC, AND POTASSIUM PHOSPHATE .53; .5; .37; .037; .03; .012; .00082 G/100ML; G/100ML; G/100ML; G/100ML; G/100ML; G/100ML; G/100ML
500 INJECTION, SOLUTION INTRAVENOUS ONCE
Status: COMPLETED | OUTPATIENT
Start: 2025-02-15 | End: 2025-02-15

## 2025-02-15 RX ORDER — POTASSIUM CHLORIDE 1500 MG/1
40 TABLET, EXTENDED RELEASE ORAL ONCE
Status: COMPLETED | OUTPATIENT
Start: 2025-02-15 | End: 2025-02-15

## 2025-02-15 RX ORDER — MAGNESIUM SULFATE HEPTAHYDRATE 40 MG/ML
2 INJECTION, SOLUTION INTRAVENOUS ONCE
Status: COMPLETED | OUTPATIENT
Start: 2025-02-15 | End: 2025-02-15

## 2025-02-15 RX ORDER — METOCLOPRAMIDE HYDROCHLORIDE 5 MG/ML
10 INJECTION INTRAMUSCULAR; INTRAVENOUS ONCE
Status: COMPLETED | OUTPATIENT
Start: 2025-02-15 | End: 2025-02-15

## 2025-02-15 RX ORDER — METOCLOPRAMIDE HYDROCHLORIDE 5 MG/ML
10 INJECTION INTRAMUSCULAR; INTRAVENOUS EVERY 8 HOURS PRN
Status: DISCONTINUED | OUTPATIENT
Start: 2025-02-15 | End: 2025-02-17 | Stop reason: HOSPADM

## 2025-02-15 RX ORDER — ACETAMINOPHEN 10 MG/ML
1000 INJECTION, SOLUTION INTRAVENOUS EVERY 6 HOURS SCHEDULED
Status: DISPENSED | OUTPATIENT
Start: 2025-02-15 | End: 2025-02-16

## 2025-02-15 RX ORDER — METOCLOPRAMIDE HYDROCHLORIDE 5 MG/ML
10 INJECTION INTRAMUSCULAR; INTRAVENOUS EVERY 8 HOURS PRN
Status: DISCONTINUED | OUTPATIENT
Start: 2025-02-15 | End: 2025-02-15

## 2025-02-15 RX ADMIN — ACETAMINOPHEN 1000 MG: 10 INJECTION INTRAVENOUS at 05:03

## 2025-02-15 RX ADMIN — POTASSIUM PHOSPHATE, MONOBASIC POTASSIUM PHOSPHATE, DIBASIC 21 MMOL: 224; 236 INJECTION, SOLUTION, CONCENTRATE INTRAVENOUS at 09:09

## 2025-02-15 RX ADMIN — SODIUM CHLORIDE, SODIUM GLUCONATE, SODIUM ACETATE, POTASSIUM CHLORIDE, MAGNESIUM CHLORIDE, SODIUM PHOSPHATE, DIBASIC, AND POTASSIUM PHOSPHATE 500 ML: .53; .5; .37; .037; .03; .012; .00082 INJECTION, SOLUTION INTRAVENOUS at 02:39

## 2025-02-15 RX ADMIN — CEFUROXIME AXETIL 500 MG: 250 TABLET, FILM COATED ORAL at 07:02

## 2025-02-15 RX ADMIN — ACETAZOLAMIDE 500 MG: 250 TABLET ORAL at 20:39

## 2025-02-15 RX ADMIN — DIPHENHYDRAMINE HYDROCHLORIDE 25 MG: 50 INJECTION, SOLUTION INTRAMUSCULAR; INTRAVENOUS at 05:03

## 2025-02-15 RX ADMIN — METOCLOPRAMIDE 10 MG: 5 INJECTION, SOLUTION INTRAMUSCULAR; INTRAVENOUS at 00:08

## 2025-02-15 RX ADMIN — CAFFEINE AND SODIUM BENZOATE 500 MG: 125 INJECTION, SOLUTION INTRAMUSCULAR; INTRAVENOUS at 05:20

## 2025-02-15 RX ADMIN — ONDANSETRON 4 MG: 2 INJECTION INTRAMUSCULAR; INTRAVENOUS at 07:02

## 2025-02-15 RX ADMIN — MAGNESIUM SULFATE HEPTAHYDRATE 2 G: 40 INJECTION, SOLUTION INTRAVENOUS at 02:38

## 2025-02-15 RX ADMIN — ACETAMINOPHEN 1000 MG: 10 INJECTION INTRAVENOUS at 17:31

## 2025-02-15 RX ADMIN — METOCLOPRAMIDE 10 MG: 5 INJECTION, SOLUTION INTRAMUSCULAR; INTRAVENOUS at 02:39

## 2025-02-15 RX ADMIN — ONDANSETRON 4 MG: 2 INJECTION INTRAMUSCULAR; INTRAVENOUS at 11:04

## 2025-02-15 RX ADMIN — CEFUROXIME AXETIL 500 MG: 250 TABLET, FILM COATED ORAL at 17:31

## 2025-02-15 RX ADMIN — DIPHENHYDRAMINE HYDROCHLORIDE 25 MG: 50 INJECTION, SOLUTION INTRAMUSCULAR; INTRAVENOUS at 00:08

## 2025-02-15 RX ADMIN — ACETAMINOPHEN 1000 MG: 10 INJECTION INTRAVENOUS at 00:08

## 2025-02-15 RX ADMIN — POTASSIUM CHLORIDE 40 MEQ: 1500 TABLET, EXTENDED RELEASE ORAL at 09:09

## 2025-02-15 RX ADMIN — METOCLOPRAMIDE 10 MG: 5 INJECTION, SOLUTION INTRAMUSCULAR; INTRAVENOUS at 05:03

## 2025-02-15 RX ADMIN — LISINOPRIL 20 MG: 20 TABLET ORAL at 11:04

## 2025-02-15 NOTE — PROGRESS NOTES
Progress Note - Critical Care/ICU   Name: Lynda Shaffer 44 y.o. female I MRN: [id number]  Unit/Bed#: ICU 08 I Date of Admission: 2/13/2025   Date of Service: 2/15/2025 I Hospital Day: 2      Assessment & Plan   Neuro:   Diagnosis: CSF leak s/p endonasal CSF leak repair and lumbar drain placement with ENT and NSGY  10/31/24 CT head/sinus: CT head/sinus demonstrated partially empty sella with a loculated CSF fluid attenuation equivalent area within the posterior aspect of the sphenoid sinus along the anterior and inferior margin of the bony sella turcica   Plan:   Diamox 500 mg BID   Lumbar drain clamped. Per nsgy and ENT, if doing well we will consider removing tomorrow  Acetaminophen 1 g IV every 6 hours  IV caffeine 500 mg as needed for low pressure headache, no more than once per 24 hours  Isolated as needed for low pressure headache  Discontinue opioids    CV:   Diagnosis: HTN   Plan:    Resume home lisinopril 20 mg daily     Pulm:  Diagnosis: None  Plan:   Monitor and maintain SpO2 >92%     GI:   Diagnosis: Hx of nausea post-anesthesia  Plan:   Discontinue Miralax and docusate   Reglan 10 mg as needed for nausea     :   Diagnosis: None  Plan:   Monitor I/O and BMP     F/E/N:   Diagnosis: None  Plan:   Encourage PO intake  Monitor and replenish electrolytes K>4, Phos>3 Mg>2  Nutrition: Regular diet     Heme/Onc:   Diagnosis: None  Plan:   Monitor CBC and transfuse <7      Endo:   Diagnosis: None  Plan:   Monitor blood glucose      ID:   Diagnosis: None  Plan:   On Ceftin 500mg BID s/p procedure (2/13-)  Monitor vitals and CBC      MSK/Skin:   Diagnosis: Rosacea pm Rhofade 1% for flares  Plan:   Use formulary medication if needed  OOB as tolerated    Disposition: Critical care    ICU Core Measures     A: Assess, Prevent, and Manage Pain Has pain been assessed? Yes  Need for changes to pain regimen? No   B: Both SAT/SAT  N/A   C: Choice of Sedation RASS Goal: N/A patient not on sedation  Need for changes to  sedation or analgesia regimen? NA   D: Delirium CAM-ICU: Negative   E: Early Mobility  Plan for early mobility? Yes   F: Family Engagement Plan for family engagement today? Yes       Antibiotic Review: Post op requirements       Prophylaxis:  VTE VTE covered by:  [Held by provider] heparin (porcine), Subcutaneous, 5,000 Units at 02/13/25 1631       Stress Ulcer  not ordered         24 Hour Events : Overnight patient had diarrhea and a headache that worsened when getting up to use the restroom.    Subjective   Review of Systems: See HPI for Review of Systems    Objective :                   Vitals I/O      Most Recent Min/Max in 24hrs   Temp 97.9 °F (36.6 °C) Temp  Min: 97.7 °F (36.5 °C)  Max: 97.9 °F (36.6 °C)   Pulse 74 Pulse  Min: 60  Max: 86   Resp 21 Resp  Min: 18  Max: 38   /74 BP  Min: 91/48  Max: 161/86   O2 Sat 98 % SpO2  Min: 97 %  Max: 100 %      Intake/Output Summary (Last 24 hours) at 2/15/2025 1431  Last data filed at 2/15/2025 1200  Gross per 24 hour   Intake 4587.46 ml   Output 905 ml   Net 3682.46 ml       Diet Regular; Regular House    Invasive Monitoring           Physical Exam   Physical Exam  Eyes:      General: No scleral icterus.        Right eye: No discharge.         Left eye: No discharge.      Extraocular Movements: Extraocular movements intact.      Pupils: Pupils are equal, round, and reactive to light.   Skin:     Coloration: Skin is not jaundiced.   HENT:      Head: Normocephalic and atraumatic.      Right Ear: Hearing normal.      Left Ear: Hearing normal.      Mouth/Throat:      Mouth: Mucous membranes are moist.      Pharynx: No oropharyngeal exudate.   Cardiovascular:      Rate and Rhythm: Normal rate and regular rhythm.      Heart sounds: Normal heart sounds.   Abdominal: General: Bowel sounds are normal.      Palpations: Abdomen is soft.   Constitutional:       General: She is not in acute distress.     Appearance: She is not toxic-appearing.      Interventions: She is not  sedated and not intubated.  Pulmonary:      Effort: Pulmonary effort is normal. She is not intubated.      Breath sounds: Normal breath sounds.   Psychiatric:         Speech: Speech is not no receptive aphasia or no expressive aphasia.   Neurological:      General: No focal deficit present.      Mental Status: She is alert and oriented to person, place and time. Mental status is at baseline.      Cranial Nerves: No dysarthria or facial asymmetry.      Sensory: Sensation is intact.      Motor: No motor deficit.      Comments:   LD clamped          Diagnostic Studies        Lab Results: I have reviewed the following results:     Medications:  Scheduled PRN   acetaminophen, 1,000 mg, Q6H KALEY  acetaZOLAMIDE, 500 mg, Q12H KALEY  cefuroxime, 500 mg, Q12H KALEY  [Held by provider] heparin (porcine), 5,000 Units, Q8H KALEY  lisinopril, 20 mg, Daily      metoclopramide, 10 mg, Q8H PRN       Continuous          Labs:   CBC    Recent Labs     02/14/25 0545 02/15/25  0442   WBC 8.23 7.84   HGB 11.9 11.6   HCT 36.0 35.3    211     BMP    Recent Labs     02/14/25  0545 02/15/25  0442   SODIUM 139 139   K 3.7 3.7   * 116*   CO2 20* 15*   AGAP 7 8   BUN 10 9   CREATININE 0.71 0.71   CALCIUM 8.8 7.7*       Coags    No recent results     Additional Electrolytes  Recent Labs     02/14/25  0545 02/15/25  0442   MG 2.4 2.8*   PHOS 2.6* 1.4*   CAIONIZED  --  1.11*          Blood Gas    No recent results  No recent results LFTs  Recent Labs     02/15/25  0442   ALT 31   AST 14   ALKPHOS 39   ALB 3.7   TBILI 0.38       Infectious  No recent results  Glucose  Recent Labs     02/14/25  0545 02/15/25  0442   GLUC 94 112

## 2025-02-15 NOTE — ASSESSMENT & PLAN NOTE
POD 2 endonasal CSF leak repair and placement of lumbar drain (Sivan / Maximino, 2/13/25)    Plan:  Continue to monitor neurological exam  LD in place, 10cc/hr. Not draining much since surgery but there is some output. Per discussion with ENT today, plan to clamp today and if doing well, remove tomorrow.  Abx per ENT service  Rest of care per ICU team / ENT  Ok to mobilize with PT/OT  DVT ppx: SCDs, SQH (currently held)    Neurosurgery will continue to follow. Please call with questions or concerns.

## 2025-02-15 NOTE — PROGRESS NOTES
"Otolaryngology HN/FPRS Progress Note:    Subjective: Pt POD 2 s/p endonasal repair CSF leak. Doing well. No acute events overnight. No rhinorrhea, no salty/metallic taste in mouth.    LD output 159 cc    Objective:   /86   Pulse 82   Temp 97.7 °F (36.5 °C) (Oral)   Resp 22   Ht 5' 2\" (1.575 m)   Wt 73.5 kg (162 lb)   SpO2 99%   BMI 29.63 kg/m²     Physical Exam   Gen: NAD  HEENT: no masses or lesions, no clear drainage from nose or posterior pharyngeal wall  Lungs: Breathing easily. No stertor or stridor  CV: RRR. Good distal perfusion  Neck: Soft and flat    Assessment/Plan: POD 2 s/p endonasal CSF leak repair - Doing well.     -No acute ENT interventions  -Bed rest with bathroom privileges   - restart Diamox BID  - Ceftin 500mg BID  - ok to clamp LD for 24 hrs and observe. Possible removal of LD tomorrow  -Sinus precautions: no nose blowing, no straws, elevate HOB, sneeze with mouth open  -Rest of management per ICU/NSGY    ENT to follow as primary    Dispo: ICU    Cate Talbert MD PGY-2  [facility] Otolaryngology - Head and Neck Surgery  Available on Epic Secure Chat.  Please contact ENT Resident [employer] chat Role for any questions or concerns.      "

## 2025-02-15 NOTE — CASE MANAGEMENT
Case Management Assessment & Discharge Planning Note    Patient name Lynda Shaffer  Location ICU 08/ICU 08 MRN [id number]  : 1980 Date 2/15/2025       Current Admission Date: 2025  Current Admission Diagnosis:CSF leak   Patient Active Problem List    Diagnosis Date Noted Date Diagnosed    CSF leak 2025     Rosacea 10/24/2022     Hypertension        LOS (days): 2  Geometric Mean LOS (GMLOS) (days):   Days to GMLOS:     OBJECTIVE:    Risk of Unplanned Readmission Score: 7.05         Current admission status: Inpatient       Preferred Pharmacy:   [facility] - [place], [place] - [address]  [address]  [address]  [facility] [address]  Phone: [phone] Fax: [phone]    [facility] - [place], [place] - [address]  [address]  [place] [address]  Phone: [phone] Fax: [phone]    Primary Care Provider: Jean Joyce MD    Primary Insurance: AETNA  Secondary Insurance:     ASSESSMENT:  Active Health Care Proxies       Eliecer Shaffer [facility] Care Representative - Spouse   Primary Phone: [phone] (Mobile)  Home Phone: [phone]                 Advance Directives  Primary Contact: Eliecer Shaffer (Spouse) [phone]    Readmission Root Cause  30 Day Readmission: No    Patient Information  Admitted from:: Home  Mental Status: Alert  During Assessment patient was accompanied by: Not accompanied during assessment  Assessment information provided by:: Patient  Primary Caregiver: Self  Support Systems: Self, Spouse/significant other  County of Residence: [place]  What city do you live in?: [place]  Home entry access options. Select all that apply.: Stairs  Number of steps to enter home.: 4  Do the steps have railings?: No  Type of Current Residence: [address] [place] [address] home  Upon entering residence, is there a bedroom on the main floor (no further steps)?: No  A bedroom is located on the following floor levels of  residence (select all that apply):: 2nd Floor  Upon entering residence, is there a bathroom on the main floor (no further steps)?: Yes  Number of steps to 2nd floor from main floor: One Flight (12 steps)  Living Arrangements: Lives w/ Spouse/significant other  Is patient a ?: No    Activities of Daily Living Prior to Admission  Functional Status: Independent  Completes ADLs independently?: Yes  Ambulates independently?: Yes  Does patient use assisted devices?: No  Does patient currently own DME?: No  Does patient have a history of Outpatient Therapy (PT/OT)?: No  Does the patient have a history of Short-Term Rehab?: No  Does patient have a history of HHC?: No  Does patient currently have HHC?: No    Patient Information Continued  Income Source: Employed  Does patient have prescription coverage?: Yes  Does patient receive dialysis treatments?: No  Does patient have a history of substance abuse?: No    Means of Transportation  Means of Transport to Appts:: Drives Self    DISCHARGE DETAILS:    Discharge planning discussed with:: patient  Freedom of Choice: Yes     CM contacted family/caregiver?: Yes  Were Treatment Team discharge recommendations reviewed with patient/caregiver?: Yes  Did patient/caregiver verbalize understanding of patient care needs?: N/A- going to facility  Were patient/caregiver advised of the risks associated with not following Treatment Team discharge recommendations?: Yes    Contacts  Patient Contacts: Eliecer Shaffer (Spouse) [phone]  Relationship to Patient:: Family  Contact Method: Phone  Phone Number: Eliecer Shaffer (Spouse)  [phone]  Reason/Outcome: Continuity of Care, Emergency Contact, Discharge Planning    Requested Home Health Care         Is the patient interested in HHC at discharge?: No    DME Referral Provided  Referral made for DME?: No    CM met with pt to discuss d/c planning  Pt lives with her spouse in a 2 story home which has 4STE and 12 steps inside. Pt has a 1st  floor 1/2 bath.   Pt's bedroom and main bathroom (tub/shower with standard toilet) are on the 2nd floor.   Pt drives. Pt works at [employer]. Pt is independent for ADL/iADLs. Pt owns no DME. Pt's had 0 falls

## 2025-02-15 NOTE — PROGRESS NOTES
Progress Note - Neurosurgery   Name: [facility] 44 y.o. female I MRN: [id number]  Unit/Bed#: ICU 08 I Date of Admission: 2/13/2025   Date of Service: 2/15/2025 I Hospital Day: 2     Assessment & Plan  CSF leak  POD 2 endonasal CSF leak repair and placement of lumbar drain (Sivan / Maximino, 2/13/25)    Plan:  Continue to monitor neurological exam  LD in place, 10cc/hr. Not draining much since surgery but there is some output. Per discussion with ENT today, plan to clamp today and if doing well, remove tomorrow.  Abx per ENT service  Rest of care per ICU team / ENT  Ok to mobilize with PT/OT  DVT ppx: SCDs, SQH (currently held)    Neurosurgery will continue to follow. Please call with questions or concerns.         Subjective   No issues overnight.    Objective :  Temp:  [97.5 °F (36.4 °C)-97.9 °F (36.6 °C)] 97.9 °F (36.6 °C)  HR:  [60-86] 80  BP: ()/(48-86) 145/81  Resp:  [18-38] 21  SpO2:  [97 %-100 %] 98 %  O2 Device: None (Room air)    I/O         02/13 0701  02/14 0700 02/14 0701  02/15 0700 02/15 0701  02/16 0700    P.O. 720      I.V. (mL/kg) 2743.3 (37.3) 2200 (29.9)     IV Piggyback 50 1496 1178.1    Total Intake(mL/kg) 3513.3 (47.8) 3696 (50.3) 1178.1 (16)    Urine (mL/kg/hr) 1300 (0.7) 1900 (1.1) 0 (0)    Emesis/NG output 0 0 1    Drains 30 159     Stool  0 0    Total Output 1330 2059 1    Net +2183.3 +1637 +1177.1           Unmeasured Urine Occurrence 1 x 3 x 3 x    Unmeasured Stool Occurrence  3 x 2 x    Unmeasured Emesis Occurrence 1 x 1 x 1 x          Physical Exam Neurological Exam  Neurologic Exam:  Awake and alert  Oriented x3  PERRL, EOMI  Speech clear and fluent  Face symmetric  Tongue midline  FOLEY to commands  Sensation to light touch and pin prick intact throughout        Lab Results: I have reviewed the following results:  Recent Labs     02/15/25  0442   WBC 7.84   HGB 11.6   HCT 35.3      SODIUM 139   K 3.7   *   CO2 15*   BUN 9   CREATININE 0.71   GLUC 112   CAIONIZED  1.11*   MG 2.8*   PHOS 1.4*   AST 14   ALT 31   ALB 3.7   TBILI 0.38   ALKPHOS 39             VTE Pharmacologic Prophylaxis: Sequential compression device (Venodyne)     Administrative Statements   I have spent a total time of 15 minutes in caring for this patient on the day of the visit/encounter including Diagnostic results, Instructions for management, Patient and family education, Impressions, Counseling / Coordination of care, Documenting in the medical record, Reviewing / ordering tests, medicine, procedures  , and Obtaining or reviewing history  .

## 2025-02-16 LAB
ANION GAP SERPL CALCULATED.3IONS-SCNC: 8 MMOL/L (ref 4–13)
BUN SERPL-MCNC: 11 MG/DL (ref 5–25)
CA-I BLD-SCNC: 1.2 MMOL/L (ref 1.12–1.32)
CALCIUM SERPL-MCNC: 8.4 MG/DL (ref 8.4–10.2)
CHLORIDE SERPL-SCNC: 115 MMOL/L (ref 96–108)
CO2 SERPL-SCNC: 14 MMOL/L (ref 21–32)
CREAT SERPL-MCNC: 0.72 MG/DL (ref 0.6–1.3)
ERYTHROCYTE [DISTWIDTH] IN BLOOD BY AUTOMATED COUNT: 12.9 % (ref 11.6–15.1)
GFR SERPL CREATININE-BSD FRML MDRD: 102 ML/MIN/1.73SQ M
GLUCOSE SERPL-MCNC: 88 MG/DL (ref 65–140)
HCT VFR BLD AUTO: 37 % (ref 34.8–46.1)
HGB BLD-MCNC: 12.4 G/DL (ref 11.5–15.4)
MAGNESIUM SERPL-MCNC: 2.1 MG/DL (ref 1.9–2.7)
MCH RBC QN AUTO: 29.1 PG (ref 26.8–34.3)
MCHC RBC AUTO-ENTMCNC: 33.5 G/DL (ref 31.4–37.4)
MCV RBC AUTO: 87 FL (ref 82–98)
PHOSPHATE SERPL-MCNC: 2 MG/DL (ref 2.7–4.5)
PLATELET # BLD AUTO: 220 THOUSANDS/UL (ref 149–390)
PMV BLD AUTO: 9.4 FL (ref 8.9–12.7)
POTASSIUM SERPL-SCNC: 3.8 MMOL/L (ref 3.5–5.3)
RBC # BLD AUTO: 4.26 MILLION/UL (ref 3.81–5.12)
SODIUM SERPL-SCNC: 137 MMOL/L (ref 135–147)
WBC # BLD AUTO: 6.68 THOUSAND/UL (ref 4.31–10.16)

## 2025-02-16 PROCEDURE — 99232 SBSQ HOSP IP/OBS MODERATE 35: CPT | Performed by: INTERNAL MEDICINE

## 2025-02-16 PROCEDURE — 97167 OT EVAL HIGH COMPLEX 60 MIN: CPT

## 2025-02-16 PROCEDURE — 99232 SBSQ HOSP IP/OBS MODERATE 35: CPT | Performed by: NURSE PRACTITIONER

## 2025-02-16 PROCEDURE — 85027 COMPLETE CBC AUTOMATED: CPT | Performed by: PHYSICIAN ASSISTANT

## 2025-02-16 PROCEDURE — 83735 ASSAY OF MAGNESIUM: CPT | Performed by: PHYSICIAN ASSISTANT

## 2025-02-16 PROCEDURE — 97163 PT EVAL HIGH COMPLEX 45 MIN: CPT

## 2025-02-16 PROCEDURE — 80048 BASIC METABOLIC PNL TOTAL CA: CPT | Performed by: PHYSICIAN ASSISTANT

## 2025-02-16 PROCEDURE — 82330 ASSAY OF CALCIUM: CPT | Performed by: PHYSICIAN ASSISTANT

## 2025-02-16 PROCEDURE — 84100 ASSAY OF PHOSPHORUS: CPT | Performed by: PHYSICIAN ASSISTANT

## 2025-02-16 RX ORDER — METHOCARBAMOL 500 MG/1
TABLET, FILM COATED ORAL
Status: COMPLETED
Start: 2025-02-16 | End: 2025-02-16

## 2025-02-16 RX ORDER — LIDOCAINE HYDROCHLORIDE 10 MG/ML
INJECTION, SOLUTION EPIDURAL; INFILTRATION; INTRACAUDAL; PERINEURAL
Status: DISPENSED
Start: 2025-02-16 | End: 2025-02-16

## 2025-02-16 RX ORDER — CAFFEINE 200 MG
400 TABLET ORAL ONCE
Status: COMPLETED | OUTPATIENT
Start: 2025-02-16 | End: 2025-02-16

## 2025-02-16 RX ORDER — OXYCODONE HYDROCHLORIDE 5 MG/1
5 TABLET ORAL EVERY 4 HOURS PRN
Status: DISCONTINUED | OUTPATIENT
Start: 2025-02-16 | End: 2025-02-17 | Stop reason: HOSPADM

## 2025-02-16 RX ORDER — SODIUM CHLORIDE, SODIUM GLUCONATE, SODIUM ACETATE, POTASSIUM CHLORIDE, MAGNESIUM CHLORIDE, SODIUM PHOSPHATE, DIBASIC, AND POTASSIUM PHOSPHATE .53; .5; .37; .037; .03; .012; .00082 G/100ML; G/100ML; G/100ML; G/100ML; G/100ML; G/100ML; G/100ML
500 INJECTION, SOLUTION INTRAVENOUS ONCE
Status: COMPLETED | OUTPATIENT
Start: 2025-02-16 | End: 2025-02-16

## 2025-02-16 RX ORDER — LIDOCAINE HYDROCHLORIDE 10 MG/ML
5 INJECTION, SOLUTION EPIDURAL; INFILTRATION; INTRACAUDAL; PERINEURAL ONCE
Status: DISCONTINUED | OUTPATIENT
Start: 2025-02-16 | End: 2025-02-17 | Stop reason: HOSPADM

## 2025-02-16 RX ORDER — CAFFEINE 200 MG
400 TABLET ORAL ONCE
Status: DISCONTINUED | OUTPATIENT
Start: 2025-02-16 | End: 2025-02-16

## 2025-02-16 RX ORDER — CAFFEINE CITRATE 20 MG/ML
300 SOLUTION INTRAVENOUS ONCE
Status: DISCONTINUED | OUTPATIENT
Start: 2025-02-16 | End: 2025-02-16

## 2025-02-16 RX ORDER — METHOCARBAMOL 500 MG/1
500 TABLET, FILM COATED ORAL EVERY 6 HOURS PRN
Status: DISCONTINUED | OUTPATIENT
Start: 2025-02-16 | End: 2025-02-17 | Stop reason: HOSPADM

## 2025-02-16 RX ORDER — CAFFEINE CITRATE 20 MG/ML
300 SOLUTION ORAL ONCE
Status: DISCONTINUED | OUTPATIENT
Start: 2025-02-16 | End: 2025-02-16

## 2025-02-16 RX ADMIN — ACETAMINOPHEN 1000 MG: 10 INJECTION INTRAVENOUS at 00:33

## 2025-02-16 RX ADMIN — ACETAMINOPHEN 1000 MG: 10 INJECTION INTRAVENOUS at 13:24

## 2025-02-16 RX ADMIN — CEFUROXIME AXETIL 500 MG: 250 TABLET, FILM COATED ORAL at 05:56

## 2025-02-16 RX ADMIN — LISINOPRIL 20 MG: 20 TABLET ORAL at 08:23

## 2025-02-16 RX ADMIN — METHOCARBAMOL 500 MG: 500 TABLET ORAL at 11:02

## 2025-02-16 RX ADMIN — ACETAMINOPHEN 1000 MG: 10 INJECTION INTRAVENOUS at 05:56

## 2025-02-16 RX ADMIN — SODIUM CHLORIDE, SODIUM GLUCONATE, SODIUM ACETATE, POTASSIUM CHLORIDE, MAGNESIUM CHLORIDE, SODIUM PHOSPHATE, DIBASIC, AND POTASSIUM PHOSPHATE 500 ML: .53; .5; .37; .037; .03; .012; .00082 INJECTION, SOLUTION INTRAVENOUS at 07:50

## 2025-02-16 RX ADMIN — ACETAMINOPHEN 1000 MG: 10 INJECTION INTRAVENOUS at 18:03

## 2025-02-16 RX ADMIN — ACETAZOLAMIDE 500 MG: 250 TABLET ORAL at 08:23

## 2025-02-16 RX ADMIN — CAFFEINE 400 MG: 200 TABLET ORAL at 08:56

## 2025-02-16 RX ADMIN — CEFUROXIME AXETIL 500 MG: 250 TABLET, FILM COATED ORAL at 17:14

## 2025-02-16 RX ADMIN — HEPARIN SODIUM 5000 UNITS: 5000 INJECTION INTRAVENOUS; SUBCUTANEOUS at 00:33

## 2025-02-16 NOTE — ASSESSMENT & PLAN NOTE
POD 3 endonasal CSF leak repair and placement of lumbar drain (Sivan / Maximino, 2/13/25)    Plan:  Continue to monitor neurological exam  LD in place, this was clamped yesterday.  Per discussion with ENT okay to remove today.  Lidocaine injected locally and drain removed without difficulty.  1 suture tied down in place  Patient to remain flat for 2 hours and then can slowly increase head of bed every hour by 15 degrees  Continue to monitor site for any drainage  Abx per ENT service  Rest of care per ICU team / ENT  Ok to mobilize with PT/OT  DVT ppx: SCDs, cleared from neurosurgical standpoint to start DVT prophylaxis tomorrow morning  Spoke with both patient and ENT resident, plan to remove lumbar suture at ENT follow-up    Neurosurgery will sign off, no neurosurgical follow-up needed.  Recommend follow-up with ENT, call with any further question or concerns.

## 2025-02-16 NOTE — PROGRESS NOTES
Progress Note - Critical Care/ICU   Name: Lynda Shaffer 44 y.o. female I MRN: [id number]  [facility] I Date of Admission: 2/13/2025   Date of Service: 2/16/2025 I Hospital Day: 3      Assessment & Plan     Neuro/Psych/ENT diagnosis: P/A/D. CSF leak. S/p lumbar drain- clamped 2/15. Headaches.   Plan:  Pain controlled with:  Scheduled: tylenol 1 gm q6 hrs x 6 doses  Regulate sleep/wake cycle as able  Delirium monitoring and precautions  CAM-ICU daily  Trend neuro exam  Diamox BID  Appreciate NSx and ENT input   Plan to d/c LD today  Cardiac/Vascular diagnosis: HTN.   Plan:  MAP goal > 65 and SBP goal <140  Follow rhythm on telemetry  Lisinopril 20 mg QD  Pulmonary/Respiratory/Thoracic diagnosis: None.   Plan:  SpO2 goal >90%  Pulmonary toileting   Encourage IS  Sinus precautions  GI/Hepatic diagnosis: None.   Plan:  Trend abd exam and bowel function  Bowel regimen: PRN  Nephro/ diagnosis: None.   Plan:  Baseline creat: 0.7  Trend UOP and BUN/creat  Strict I and O  F/E/N: None.   Plan:  Nutrition/diet plan: PO diet  Replenish electrolytes with goals: K >4.0, Mag >2.0, and Phos >3.0  Heme/onc diagnosis: None.   Plan:  Trend hgb and plts  Transfuse as needed for goal hgb >7.0  Endo diagnosis: None.  Plan:  Glycemic control plan: n/a  Goal blood glucose 140-180  ID diagnosis: Post-op ABX prophylaxis.   Plan:  Abx ordered: Ceftin BID  Day # 4   Trend temps and WBC count  Maintain normothermia  MSK/Skin diagnosis: None.   Plan:  Mobility goal: OOB   PT and OT when appropriate  Frequent turning and pressure off-loading  Local wound care as needed    Disposition: Med Surg    ICU Core Measures     A: Assess, Prevent, and Manage Pain Has pain been assessed? Yes  Need for changes to pain regimen? No   B: Both SAT/SAT  N/A   C: Choice of Sedation RASS Goal: N/A patient not on sedation  Need for changes to sedation or analgesia regimen? NA   D: Delirium CAM-ICU: Negative   E: Early Mobility  Plan for early mobility?  Yes   F: Family Engagement Plan for family engagement today? Yes       Antibiotic Review: Post op requirements       Prophylaxis:  VTE VTE covered by:  heparin (porcine), Subcutaneous, 5,000 Units at 02/16/25 0033       Stress Ulcer  not ordered         24 Hour Events : Patient with low pressure HA. Resolved with caffeine and IVF. Reglan given for nausea.     Subjective   Review of Systems: See HPI for Review of Systems    Objective :                   Vitals I/O      Most Recent Min/Max in 24hrs   Temp 97.7 °F (36.5 °C) Temp  Min: 97.7 °F (36.5 °C)  Max: 97.9 °F (36.6 °C)   Pulse 77 Pulse  Min: 74  Max: 86   Resp 17 Resp  Min: 17  Max: 22   /72 BP  Min: 125/59  Max: 161/86   O2 Sat 96 % SpO2  Min: 96 %  Max: 99 %      Intake/Output Summary (Last 24 hours) at 2/16/2025 0518  Last data filed at 2/15/2025 1400  Gross per 24 hour   Intake 1278.13 ml   Output 31 ml   Net 1247.13 ml       Diet Regular; Regular House    Invasive Monitoring           Physical Exam   Physical Exam  Vitals reviewed.   Skin:     General: Skin is warm and dry.   HENT:      Head: Atraumatic.   Cardiovascular:      Rate and Rhythm: Normal rate and regular rhythm.      Heart sounds: No murmur heard.     No friction rub.   Abdominal:      Palpations: Abdomen is soft.   Constitutional:       General: She is not in acute distress.     Appearance: She is well-developed.   Pulmonary:      Effort: Pulmonary effort is normal. No respiratory distress.      Breath sounds: Normal breath sounds.   Neurological:      General: No focal deficit present.      Mental Status: She is alert and oriented to person, place and time.          Diagnostic Studies        Lab Results: I have reviewed the following results:     Medications:  Scheduled PRN   acetaminophen, 1,000 mg, Q6H KALEY  acetaZOLAMIDE, 500 mg, Q12H KALEY  cefuroxime, 500 mg, Q12H KALEY  heparin (porcine), 5,000 Units, Q8H KALEY  lisinopril, 20 mg, Daily      metoclopramide, 10 mg, Q8H PRN        Continuous          Labs:   CBC    Recent Labs     02/14/25  0545 02/15/25  0442   WBC 8.23 7.84   HGB 11.9 11.6   HCT 36.0 35.3    211     BMP    Recent Labs     02/14/25  0545 02/15/25  0442   SODIUM 139 139   K 3.7 3.7   * 116*   CO2 20* 15*   AGAP 7 8   BUN 10 9   CREATININE 0.71 0.71   CALCIUM 8.8 7.7*       Coags    No recent results     Additional Electrolytes  Recent Labs     02/14/25  0545 02/15/25  0442   MG 2.4 2.8*   PHOS 2.6* 1.4*   CAIONIZED  --  1.11*          Blood Gas    No recent results  No recent results LFTs  Recent Labs     02/15/25  0442   ALT 31   AST 14   ALKPHOS 39   ALB 3.7   TBILI 0.38       Infectious  No recent results  Glucose  Recent Labs     02/14/25  0545 02/15/25  0442   GLUC 94 112        Administrative Statements

## 2025-02-16 NOTE — PROGRESS NOTES
"Otolaryngology HN/FPRS Progress Note:    Subjective: Pt POD 3 s/p endonasal repair CSF leak. Doing well. No acute events overnight. No rhinorrhea, no salty/metallic taste in mouth. Increased positional neck pain when head is elevated. Baseline headache still present    LD clamped ovn.    Objective:   /70   Pulse 84   Temp 98 °F (36.7 °C) (Oral)   Resp 19   Ht 5' 2\" (1.575 m)   Wt 73.5 kg (162 lb)   SpO2 98%   BMI 29.63 kg/m²     Physical Exam   Gen: NAD  HEENT: no masses or lesions, no clear drainage from nose or posterior pharyngeal wall  Lungs: Breathing easily. No stertor or stridor  CV: RRR. Good distal perfusion  Neck: Soft and flat    Assessment/Plan: POD 3 s/p endonasal CSF leak repair - Doing well.     -No acute ENT interventions  - OOB to chair and walking once lumbar drain is removed  -  Diamox BID held per neuro ICU for low pressure headaches  - Ceftin 500mg BID  - remove LD drain today, hold DVT ppx until 2/17 AM  -Sinus precautions: no nose blowing, no straws, elevate HOB, sneeze with mouth open  -Rest of management per ICU/NSGY    ENT to follow as primary    Dispo: ICU    Cate Talbert MD PGY-2  [facility] Otolaryngology - Head and Neck Surgery  Available on Epic Secure Chat.  Please contact ENT Resident the Shelf Role for any questions or concerns.      "

## 2025-02-16 NOTE — PLAN OF CARE
Problem: OCCUPATIONAL THERAPY ADULT  Goal: Performs self-care activities at highest level of function for planned discharge setting.  See evaluation for individualized goals.  Description: Treatment Interventions: ADL retraining, Functional transfer training, Endurance training, Continued evaluation, Energy conservation          See flowsheet documentation for full assessment, interventions and recommendations.   Outcome: Progressing  Note: Limitation: Decreased ADL status, Decreased Safe judgement during ADL, Decreased endurance, Decreased self-care trans, Decreased high-level ADLs  Prognosis: Good  Assessment: Pt is a 44 y.o. female who was admitted to [facility] on 2/13/2025 with CSF leak, s/p endonasal CSF leak repair and placement of lumbar drain. Pt seen for an OT evaluation per active OT orders.  Pt  has a past medical history of Allergic, Anxiety, GERD (gastroesophageal reflux disease), Hypertension, Ovarian cyst, PONV (postoperative nausea and vomiting), Sinus headache, and Sleep disorder. Pt lives in a [facility] with [facility], uses a tub shower and standard toilet. Pta, pt was independent w/ ADL/IADL and functional mobility, was (+) driving and was not using any DME at baseline. Currently, pt is supervision-Min Ax1 for UB ADL, Min Ax1 for LB ADL, and completed transfers/FM w Min Ax1. Pt currently presents with impairments in the following categories -steps to enter environment and difficulty performing ADLS activity tolerance, endurance, and standing balance/tolerance. These impairments, as well as pt's fatigue, pain, and risk for falls  limit pt's ability to safely engage in all baseline areas of occupation, includinggrooming, bathing, dressing, toileting, and functional mobility/transfers.  The patient's raw score on the -PAC Daily Activity Inpatient Short Form is 19. A raw score of greater than or equal to 19 suggests the patient may benefit from discharge to home. Please refer to the  recommendation of the Occupational Therapist for safe discharge planning. Pt would benefit from continued acute OT services throughout hospital course. Plan for OT interventions 2-3x per week. From OT standpoint, recommend home with increased social support, no further OT needs pending progress upon D/C. Pt was left supine in bed with alarm on and all needs within reach.     Rehab Resource Intensity Level, OT: No post-acute rehabilitation needs (pending progress)

## 2025-02-16 NOTE — PLAN OF CARE
Problem: PHYSICAL THERAPY ADULT  Goal: Performs mobility at highest level of function for planned discharge setting.  See evaluation for individualized goals.  Description: Treatment/Interventions: Functional transfer training, LE strengthening/ROM, Elevations, Therapeutic exercise, Endurance training, Bed mobility, Gait training, Spoke to nursing, OT          See flowsheet documentation for full assessment, interventions and recommendations.  Note: Prognosis: Good  Problem List: Decreased strength, Decreased endurance, Impaired balance, Decreased mobility, Pain  Assessment: Pt is 44 y.o. female admitted with Dx of CSF leak and underwent endonasal CSF leak repair and placement of lumbar drain on 2/13/2025. Pt 's comorbidities affecting POC include:  Anxiety, Hypertension, Sinus headache, and Sleep disorder and personal factors of: CAT and steps in the house. Pt's clinical presentation is currently  unstable/unpredictable which is evident in ongoing telem monitoring, lumbar drain in place, ongoing postop management and decreased tolerance to upright position w/ associated HA/suboccipital pain. Pt presents w/ min overall weakness, decreased functional endurance activity tolerance and inconsistent amb balance and gait patterns requiring min (A) to assure safety. Will cont to follow pt in PT for progressive mobilization to address above functional deficits and to max level of (I), endurance, and safety. D/C recommendations are outlined below. Will cont to follow until then.        Rehab Resource Intensity Level, PT: No post-acute rehabilitation needs    See flowsheet documentation for full assessment.

## 2025-02-16 NOTE — PLAN OF CARE
Problem: PAIN - ADULT  Goal: Verbalizes/displays adequate comfort level or baseline comfort level  Description: Interventions:  - Encourage patient to monitor pain and request assistance  - Assess pain using appropriate pain scale  - Administer analgesics based on type and severity of pain and evaluate response  - Implement non-pharmacological measures as appropriate and evaluate response  - Consider cultural and social influences on pain and pain management  - Notify physician/advanced practitioner if interventions unsuccessful or patient reports new pain  Outcome: Progressing     Problem: INFECTION - ADULT  Goal: Absence or prevention of progression during hospitalization  Description: INTERVENTIONS:  - Assess and monitor for signs and symptoms of infection  - Monitor lab/diagnostic results  - Monitor all insertion sites, i.e. indwelling lines, tubes, and drains  - Monitor endotracheal if appropriate and nasal secretions for changes in amount and color  - [place] appropriate cooling/warming therapies per order  - Administer medications as ordered  - Instruct and encourage patient and family to use good hand hygiene technique  - Identify and instruct in appropriate isolation precautions for identified infection/condition  Outcome: Progressing     Problem: SAFETY ADULT  Goal: Patient will remain free of falls  Description: INTERVENTIONS:  - Educate patient/family on patient safety including physical limitations  - Instruct patient to call for assistance with activity   - Consult OT/PT to assist with strengthening/mobility   - Keep Call bell within reach  - Keep bed low and locked with side rails adjusted as appropriate  - Keep care items and personal belongings within reach  - Initiate and maintain comfort rounds  - Make Fall Risk Sign visible to staff  - Offer Toileting every 2 Hours, in advance of need  - Initiate/Maintain bed alarm  - Apply yellow socks and bracelet for high fall risk patients  - Consider  moving patient to room near nurses station  Outcome: Progressing  Goal: Maintain or return to baseline ADL function  Description: INTERVENTIONS:  -  Assess patient's ability to carry out ADLs; assess patient's baseline for ADL function and identify physical deficits which impact ability to perform ADLs (bathing, care of mouth/teeth, toileting, grooming, dressing, etc.)  - Assess/evaluate cause of self-care deficits   - Assess range of motion  - Assess patient's mobility; develop plan if impaired  - Assess patient's need for assistive devices and provide as appropriate  - Encourage maximum independence but intervene and supervise when necessary  - Involve family in performance of ADLs  - Assess for home care needs following discharge   - Consider OT consult to assist with ADL evaluation and planning for discharge  - Provide patient education as appropriate  Outcome: Progressing  Goal: Maintains/Returns to pre admission functional level  Description: INTERVENTIONS:  - Perform AM-PAC 6 Click Basic Mobility/ Daily Activity assessment daily.  - Set and communicate daily mobility goal to care team and patient/family/caregiver.   - Collaborate with rehabilitation services on mobility goals if consulted  - Perform Range of Motion 3 times a day.  - Reposition patient every 2 hours.  - Dangle patient 3 times a day  - Stand patient 2 times a day  - Ambulate patient 3 times a day  - Out of bed to chair 3 times a day   - Out of bed for meals 2 times a day  - Out of bed for toileting  - Record patient progress and toleration of activity level   Outcome: Progressing     Problem: DISCHARGE PLANNING  Goal: Discharge to home or other facility with appropriate resources  Description: INTERVENTIONS:  - Identify barriers to discharge w/patient and caregiver  - Arrange for needed discharge resources and transportation as appropriate  - Identify discharge learning needs (meds, wound care, etc.)  - Arrange for interpretive services to  assist at discharge as needed  - Refer to Case Management Department for coordinating discharge planning if the patient needs post-hospital services based on physician/advanced practitioner order or complex needs related to functional status, cognitive ability, or social support system  Outcome: Progressing     Problem: Knowledge Deficit  Goal: Patient/family/caregiver demonstrates understanding of disease process, treatment plan, medications, and discharge instructions  Description: Complete learning assessment and assess knowledge base.  Interventions:  - Provide teaching at level of understanding  - Provide teaching via preferred learning methods  Outcome: Progressing     Problem: NEUROSENSORY - ADULT  Goal: Achieves stable or improved neurological status  Description: INTERVENTIONS  - Monitor and report changes in neurological status  - Monitor vital signs such as temperature, blood pressure, glucose, and any other labs ordered   - Initiate measures to prevent increased intracranial pressure  - Monitor for seizure activity and implement precautions if appropriate      Outcome: Progressing  Goal: Remains free of injury related to seizures activity  Description: INTERVENTIONS  - Maintain airway, patient safety  and administer oxygen as ordered  - Monitor patient for seizure activity, document and report duration and description of seizure to physician/advanced practitioner  - If seizure occurs,  ensure patient safety during seizure  - Reorient patient post seizure  - Seizure pads on all 4 side rails  - Instruct patient/family to notify RN of any seizure activity including if an aura is experienced  - Instruct patient/family to call for assistance with activity based on nursing assessment  - Administer anti-seizure medications if ordered    Outcome: Progressing  Goal: Achieves maximal functionality and self care  Description: INTERVENTIONS  - Monitor swallowing and airway patency with patient fatigue and changes in  neurological status  - Encourage and assist patient to increase activity and self care.   - Encourage visually impaired, hearing impaired and aphasic patients to use assistive/communication devices  Outcome: Progressing     Problem: Prexisting or High Potential for Compromised Skin Integrity  Goal: Skin integrity is maintained or improved  Description: INTERVENTIONS:  - Identify patients at risk for skin breakdown  - Assess and monitor skin integrity  - Assess and monitor nutrition and hydration status  - Monitor labs   - Assess for incontinence   - Turn and reposition patient  - Assist with mobility/ambulation  - Relieve pressure over bony prominences  - Avoid friction and shearing  - Provide appropriate hygiene as needed including keeping skin clean and dry  - Evaluate need for skin moisturizer/barrier cream  - Collaborate with interdisciplinary team   - Patient/family teaching  - Consider wound care consult   Outcome: Progressing

## 2025-02-16 NOTE — HOSPITAL COURSE
44 y.o. female with PMH of HTN and rosacea who presented for scheduled endoscopic CSF repair of the sphenoid sinus with lumbar drain placement. Patient was started on cefuroxime post-procedure for nasal packing. Post-operatively, patient experienced nausea and vomiting. She also reported frontal HA and pain down neck. She reported having history of post-anesthesia nausea as well as nausea with oxycodone. Lumbar drain was clamped on 2/15 and taken out today. Patient has continued to have low pressure HA that responds well to caffeine and fluid boluses. Patient has been on Dimox, however has been discontinued for the time being as it has been contributing to her low pressure HA. Home lisinopril held for the time being.

## 2025-02-16 NOTE — PHYSICAL THERAPY NOTE
Physical Therapy Evaluation     Patient's Name: Lynda Toelntinober    Admitting Diagnosis  CSF leak [G96.00]    Problem List  Patient Active Problem List   Diagnosis    Hypertension    Rosacea    CSF leak       Past Medical History  Past Medical History:   Diagnosis Date    Allergic     Anxiety     GERD (gastroesophageal reflux disease)     Hypertension     Ovarian cyst     PONV (postoperative nausea and vomiting)     Sinus headache     probably from elevated BP    Sleep disorder        Past Surgical History  Past Surgical History:   Procedure Laterality Date    ANKLE SURGERY Right     1998, 1999    LIPOSUCTION W/ FAT INJECTION Bilateral 2/13/2025    Procedure: ABDOMINAL FAT GAFT;  Surgeon: Chele St MD;  [facility];  Service: ENT    MOLE REMOVAL      from neck; benign    OOPHORECTOMY Right     OVARIAN CYST REMOVAL      [place]Y NASAL/SINUS NDSC RPR CEREBSP FLUID LEAK SPHENOID N/A 2/13/2025    Procedure: FUNCTIONAL ENDOSCOPIC SINUS SURGERY (FESS) IMAGED GUIDED SPHENOID SINUS,;  Surgeon: Chele St MD;  [facility];  Service: ENT    [place] SECONDARY RPR DURA CSF LEAK FREE TISSUE GRAFT N/A 2/13/2025    Procedure: REPAIR CSF LEAK TRANSNASAL;  Surgeon: Chele St MD;  [facility];  Service: ENT    WY SEPTOPLASTY/SUBMUCOUS RESECJ W/WO CARTILAGE GRF N/A 2/13/2025    Procedure: SEPTOPLASTY;  Surgeon: Chele St MD;  [facility];  Service: ENT    TUBAL LIGATION      WISDOM TOOTH EXTRACTION  2012    x2    WRIST SURGERY Right 2013          02/16/25 0915   PT Last Visit   PT Visit Date 02/16/25   Note Type   Note type Evaluation   Pain Assessment   Pain Assessment Tool FLACC   Pain Location/Orientation Location: Head  (suboccipital area)   Pain Onset/Description Onset: Ongoing   Effect of Pain on Daily Activities gradually increased discomfort when upright   Patient's Stated Pain Goal No pain   Hospital Pain Intervention(s) Repositioned;Ambulation/increased  activity;Emotional support  (spoke to RN)   Pain Rating: FLACC (Rest) - Face 0   Pain Rating: FLACC (Rest) - Legs 0   Pain Rating: FLACC (Rest) - Activity 0   Pain Rating: FLACC (Rest) - Cry 0   Pain Rating: FLACC (Rest) - Consolability 0   Score: FLACC (Rest) 0   Pain Rating: FLACC (Activity) - Face 1   Pain Rating: FLACC (Activity) - Legs 0   Pain Rating: FLACC (Activity) - Activity 1   Pain Rating: FLACC (Activity) - Cry 1   Pain Rating: FLACC (Activity) - Consolability 1   Score: FLACC (Activity) 4   Restrictions/Precautions   Braces or Orthoses   (denies)   Other Precautions Multiple lines;Telemetry;Pain;Bed Alarm  (lumbar drain in place (clamped))   Home Living   Type of Home House   Home Layout Two level  (4 CAT w/ hand rail)   Prior Function   Level of [place] Independent with functional mobility  (amb w/o AD)   Lives With Spouse   Vocational Full time employment   General   Additional Pertinent History Cleared for assessment by nsg (also OK to mobilize per NS note)   Cognition   Overall Cognitive Status WFL   Arousal/Participation Alert   Orientation Level Oriented to person;Oriented to place;Oriented to situation   Memory Within functional limits   Following Commands Follows one step commands without difficulty   Subjective   Subjective Alert; in bed/laying flat; agreeable to try mobilization   RUE Assessment   RUE Assessment WFL  (AROM)   LUE Assessment   LUE Assessment WFL  (AROM)   RLE Assessment   RLE Assessment WFL  (AROM)   Strength RLE   RLE Overall Strength   (good -)   LLE Assessment   LLE Assessment WFL  (AROM)   Strength LLE   LLE Overall Strength   (good -)   Bed Mobility   Supine to Sit 4  Minimal assistance  (via log rolling)   Additional items Assist x 1;Increased time required;Verbal cues;LE management   Sit to Supine 4  Minimal assistance   Additional items Assist x 1;Increased time required;Verbal cues;LE management   Transfers   Sit to Stand 4  Minimal assistance   Additional items  Assist x 1;Verbal cues   Stand to Sit 4  Minimal assistance   Additional items Assist x 1;Verbal cues   Ambulation/Elevation   Gait pattern Excessively slow;Short stride;Inconsistent kaylee   Gait Assistance 4  Minimal assist   Additional items Assist x 1;Verbal cues;Tactile cues   Assistive Device Other (Comment)  (hand hold (A))   Distance 20 ft   Balance   Static Sitting Fair   Dynamic Sitting Fair -   Static Standing Poor +   Dynamic Standing Poor +   Ambulatory Poor +   Activity Tolerance   Activity Tolerance Patient limited by fatigue;Patient limited by pain   Medical Staff Made Aware Co-eval performed w/ OTR due to complexity of medical status   Nurse Made Aware spoke to POLLO Newman   Assessment   Prognosis Good   Problem List Decreased strength;Decreased endurance;Impaired balance;Decreased mobility;Pain   Assessment Pt is 44 y.o. female admitted with Dx of CSF leak and underwent endonasal CSF leak repair and placement of lumbar drain on 2/13/2025. Pt 's comorbidities affecting POC include:  Anxiety, Hypertension, Sinus headache, and Sleep disorder and personal factors of: CAT and steps in the house. Pt's clinical presentation is currently  unstable/unpredictable which is evident in ongoing telem monitoring, lumbar drain in place, ongoing postop management and decreased tolerance to upright position w/ associated HA/suboccipital pain. Pt presents w/ min overall weakness, decreased functional endurance activity tolerance and inconsistent amb balance and gait patterns requiring min (A) to assure safety. Will cont to follow pt in PT for progressive mobilization to address above functional deficits and to max level of (I), endurance, and safety. D/C recommendations are outlined below. Will cont to follow until then.   Goals   Patient Goals to get better   STG Expiration Date 02/23/25   Short Term Goal #1 5-7 days. Pt will amb 300 ft w/o assistive device, (I) in order to facilitate safe return to premorbid  environment and community amb status. Pt will negotiate 12 steps w/ hand rail, mod (I) in order to navigate between levels of home environment safely. Pt will negotiate 4  steps w/o hand rail, (S)x1 in order to assure safe navigation in and out of the premorbid living environment. Pt will achieve mod (I) level w/ bed mob in order to facilitate safety with OOB and back to bed transitions in own living environment. Pt will perform transfers w/ mod (I) to assure (I) and safety w/ transitions during aspects of mobility/locomotion.  Pt will participate in LE therex and balance activities to max progression w/ mobility skills.   PT Treatment Day 0   Plan   Treatment/Interventions Functional transfer training;LE strengthening/ROM;Elevations;Therapeutic exercise;Endurance training;Bed mobility;Gait training;Spoke to nursing;OT   PT Frequency 3-5x/wk   Discharge Recommendation   Rehab Resource Intensity Level, PT No post-acute rehabilitation needs   AM-PAC Basic Mobility Inpatient   Turning in Flat Bed Without Bedrails 4   Lying on Back to Sitting on Edge of Flat Bed Without Bedrails 3   Moving Bed to Chair 3   Standing Up From Chair Using Arms 3   Walk in Room 3   Climb 3-5 Stairs With Railing 2   Basic Mobility Inpatient Raw Score 18   Basic Mobility Standardized Score 41.05   [facility] Highest Level Of Mobility   -HLM Goal 6: Walk 10 steps or more   JH-HLM Achieved 6: Walk 10 steps or more   Modified Triston Scale   Modified Triston Scale 4   End of Consult   Patient Position at End of Consult Supine;Bed/Chair alarm activated;All needs within reach         Anthony Mccartney, PT

## 2025-02-16 NOTE — PLAN OF CARE
Problem: PAIN - ADULT  Goal: Verbalizes/displays adequate comfort level or baseline comfort level  Description: Interventions:  - Encourage patient to monitor pain and request assistance  - Assess pain using appropriate pain scale  - Administer analgesics based on type and severity of pain and evaluate response  - Implement non-pharmacological measures as appropriate and evaluate response  - Consider cultural and social influences on pain and pain management  - Notify physician/advanced practitioner if interventions unsuccessful or patient reports new pain  Outcome: Progressing     Problem: INFECTION - ADULT  Goal: Absence or prevention of progression during hospitalization  Description: INTERVENTIONS:  - Assess and monitor for signs and symptoms of infection  - Monitor lab/diagnostic results  - Monitor all insertion sites, i.e. indwelling lines, tubes, and drains  - Monitor endotracheal if appropriate and nasal secretions for changes in amount and color  - Bennett appropriate cooling/warming therapies per order  - Administer medications as ordered  - Instruct and encourage patient and family to use good hand hygiene technique  - Identify and instruct in appropriate isolation precautions for identified infection/condition  Outcome: Progressing     Problem: SAFETY ADULT  Goal: Patient will remain free of falls  Description: INTERVENTIONS:  - Educate patient/family on patient safety including physical limitations  - Instruct patient to call for assistance with activity   - Consult OT/PT to assist with strengthening/mobility   - Keep Call bell within reach  - Keep bed low and locked with side rails adjusted as appropriate  - Keep care items and personal belongings within reach  - Initiate and maintain comfort rounds  - Make Fall Risk Sign visible to staff  - Offer Toileting every 2 Hours, in advance of need  - Initiate/Maintain bed alarm  - Obtain necessary fall risk management equipment:   - Apply yellow socks and  bracelet for high fall risk patients  - Consider moving patient to room near nurses station  Outcome: Progressing  Goal: Maintain or return to baseline ADL function  Description: INTERVENTIONS:  -  Assess patient's ability to carry out ADLs; assess patient's baseline for ADL function and identify physical deficits which impact ability to perform ADLs (bathing, care of mouth/teeth, toileting, grooming, dressing, etc.)  - Assess/evaluate cause of self-care deficits   - Assess range of motion  - Assess patient's mobility; develop plan if impaired  - Assess patient's need for assistive devices and provide as appropriate  - Encourage maximum independence but intervene and supervise when necessary  - Involve family in performance of ADLs  - Assess for home care needs following discharge   - Consider OT consult to assist with ADL evaluation and planning for discharge  - Provide patient education as appropriate  Outcome: Progressing  Goal: Maintains/Returns to pre admission functional level  Description: INTERVENTIONS:  - Perform AM-PAC 6 Click Basic Mobility/ Daily Activity assessment daily.  - Set and communicate daily mobility goal to care team and patient/family/caregiver.   - Collaborate with rehabilitation services on mobility goals if consulted  - Perform Range of Motion 4 times a day.  - Reposition patient every 2 hours.  - Out of bed for toileting  - Record patient progress and toleration of activity level   Outcome: Progressing

## 2025-02-16 NOTE — OCCUPATIONAL THERAPY NOTE
Occupational Therapy Evaluation     Patient Name: Lynda Shaffer  Today's Date: 2/16/2025  Problem List  Principal Problem:    CSF leak    Past Medical History  Past Medical History:   Diagnosis Date    Allergic     Anxiety     GERD (gastroesophageal reflux disease)     Hypertension     Ovarian cyst     PONV (postoperative nausea and vomiting)     Sinus headache     probably from elevated BP    Sleep disorder      Past Surgical History  Past Surgical History:   Procedure Laterality Date    ANKLE SURGERY Right     1998, 1999    LIPOSUCTION W/ FAT INJECTION Bilateral 2/13/2025    Procedure: ABDOMINAL FAT GAFT;  Surgeon: Chele St MD;  [facility];  Service: ENT    MOLE REMOVAL      from neck; benign    OOPHORECTOMY Right     OVARIAN CYST REMOVAL      [place] NASAL/SINUS NDSC RPR CEREBSP FLUID LEAK SPHENOID N/A 2/13/2025    Procedure: FUNCTIONAL ENDOSCOPIC SINUS SURGERY (FESS) IMAGED GUIDED SPHENOID SINUS,;  Surgeon: Chele St MD;  [facility];  Service: ENT    [place] SECONDARY RPR DURA CSF LEAK FREE TISSUE GRAFT N/A 2/13/2025    Procedure: REPAIR CSF LEAK TRANSNASAL;  Surgeon: Chele St MD;  [facility];  Service: ENT    IL SEPTOPLASTY/SUBMUCOUS RESECJ W/WO CARTILAGE GRF N/A 2/13/2025    Procedure: SEPTOPLASTY;  Surgeon: Chele St MD;  [facility];  Service: ENT    TUBAL LIGATION      WISDOM TOOTH EXTRACTION  2012    x2    WRIST SURGERY Right 2013             02/16/25 0916   OT Last Visit   OT Visit Date 02/16/25   Note Type   Note type Evaluation   Pain Assessment   Pain Assessment Tool FLACC   Pain Location/Orientation Location: Head   Patient's Stated Pain Goal No pain   Hospital Pain Intervention(s) Repositioned;Ambulation/increased activity   Pain Rating: FLACC (Rest) - Face 0   Pain Rating: FLACC (Rest) - Legs 0   Pain Rating: FLACC (Rest) - Activity 0   Pain Rating: FLACC (Rest) - Cry 0   Pain Rating: FLACC (Rest) - Consolability 0   Score: FLACC  "(Rest) 0   Pain Rating: FLACC (Activity) - Face 1   Pain Rating: FLACC (Activity) - Legs 0   Pain Rating: FLACC (Activity) - Activity 1   Pain Rating: FLACC (Activity) - Cry 1   Pain Rating: FLACC (Activity) - Consolability 1   Score: FLACC (Activity) 4   Restrictions/Precautions   Other Precautions Bed Alarm;Multiple lines;Pain;Telemetry;Fall Risk  (lumbar drain clamped)   Home Living   Type of Home House   Home Layout Two level;Stairs to enter with rails;1/2 bath on main level  (4 CAT)   Bathroom Shower/Tub Tub/shower unit   Bathroom Toilet Standard   Bathroom Accessibility Accessible   Additional Comments Pt lives in a 2  with 4 CAT, uses a tub shower and standard toilet.   Prior Function   Level of [place] Independent with ADLs;Independent with functional mobility;Independent with IADLS   Lives With Spouse;Son   Receives Help From Family   IADLs Independent with driving;Independent with meal prep;Independent with medication management   Falls in the last 6 months 0   Vocational Full time employment   Lifestyle   Autonomy Pta pt I with ADL, IADL and functional mobility, (+) .   Reciprocal Relationships supportive spouse   Service to Others Float: [place]   Intrinsic Gratification enjoys [employer]   Subjective   Subjective \"My head hurts when I get up\"   ADL   Where Assessed Edge of bed   Eating Assistance 5  Supervision/Setup   Grooming Assistance 5  Supervision/Setup   UB Bathing Assistance 4  Minimal Assistance   LB Bathing Assistance 4  Minimal Assistance   UB Dressing Assistance 5  Supervision/Setup   LB Dressing Assistance 4  Minimal Assistance   Toileting Assistance  4  Minimal Assistance   Functional Assistance 4  Minimal Assistance   Bed Mobility   Supine to Sit 4  Minimal assistance   Additional items Assist x 1;Increased time required;Verbal cues   Sit to Supine 4  Minimal assistance   Additional items Assist x 1;Increased time required;Verbal cues   Additional Comments Pt greeted and " left in bed with alarm on and all needs within reach.   Transfers   Sit to Stand 4  Minimal assistance   Additional items Assist x 1;Increased time required;Verbal cues   Stand to Sit 4  Minimal assistance   Additional items Assist x 1;Increased time required;Verbal cues   Additional Comments with HHA   Functional Mobility   Functional Mobility 4  Minimal assistance   Additional Comments Pt performs short distance mobility with MIN A x 1 with HHA, deferred further FM due to worsening headache.   Additional items Hand hold assistance   Balance   Static Sitting Fair   Dynamic Sitting Fair -   Static Standing Poor +   Dynamic Standing Poor +   Ambulatory Poor +   Activity Tolerance   Activity Tolerance Patient limited by pain;Patient limited by fatigue   Medical Staff Made Aware Co-eval with DPT due to medical complexity.   Nurse Made Aware RN cleared for therapy.   RUE Assessment   RUE Assessment WFL   LUE Assessment   LUE Assessment WFL   Hand Function   Gross Motor Coordination Functional   Fine Motor Coordination Functional   Sensation   Light Touch No apparent deficits   Vision-Basic Assessment   Current Vision Wears glasses for distance only   Cognition   Overall Cognitive Status WFL   Arousal/Participation Cooperative;Alert   Attention Attends with cues to redirect   Orientation Level Oriented to person;Oriented to place;Oriented to situation   Memory Within functional limits   Following Commands Follows one step commands without difficulty   Comments Pt cooperative to therapy, mainly limited by headache at this time, will cotninue to assess.   Assessment   Limitation Decreased ADL status;Decreased Safe judgement during ADL;Decreased endurance;Decreased self-care trans;Decreased high-level ADLs   Prognosis Good   Assessment Pt is a 44 y.o. female who was admitted to [facility] on 2/13/2025 with CSF leak, s/p endonasal CSF leak repair and placement of lumbar drain. Pt seen for an OT evaluation per  active OT orders.  Pt  has a past medical history of Allergic, Anxiety, GERD (gastroesophageal reflux disease), Hypertension, Ovarian cyst, PONV (postoperative nausea and vomiting), Sinus headache, and Sleep disorder. Pt lives in a [facility] with 4 CAT, uses a tub shower and standard toilet. Pta, pt was independent w/ ADL/IADL and functional mobility, was (+) driving and was not using any DME at baseline. Currently, pt is supervision-Min Ax1 for UB ADL, Min Ax1 for LB ADL, and completed transfers/FM w Min Ax1. Pt currently presents with impairments in the following categories -steps to enter environment and difficulty performing ADLS activity tolerance, endurance, and standing balance/tolerance. These impairments, as well as pt's fatigue, pain, and risk for falls  limit pt's ability to safely engage in all baseline areas of occupation, includinggrooming, bathing, dressing, toileting, and functional mobility/transfers.  The patient's raw score on the AM-PAC Daily Activity Inpatient Short Form is 19. A raw score of greater than or equal to 19 suggests the patient may benefit from discharge to home. Please refer to the recommendation of the Occupational Therapist for safe discharge planning. Pt would benefit from continued acute OT services throughout hospital course. Plan for OT interventions 2-3x per week. From OT standpoint, recommend home with increased social support, no further OT needs pending progress upon D/C. Pt was left supine in bed with alarm on and all needs within reach.   Goals   Patient Goals to feel better   Plan   Treatment Interventions ADL retraining;Functional transfer training;Endurance training;Continued evaluation;Energy conservation   Goal Expiration Date 03/02/25   OT Frequency 2-3x/wk   Discharge Recommendation   Rehab Resource Intensity Level, OT No post-acute rehabilitation needs  (pending progress)   AM-PAC Daily Activity Inpatient   Lower Body Dressing 3   Bathing 3   Toileting 3   Upper Body  Dressing 3   Grooming 3   Eating 4   Daily Activity Raw Score 19   Daily Activity Standardized Score (Calc for Raw Score >=11) 40.22   AM-PAC Applied Cognition Inpatient   Following a Speech/Presentation 4   Understanding Ordinary Conversation 4   Taking Medications 4   Remembering Where Things Are Placed or Put Away 4   Remembering List of 4-5 Errands 4   Taking Care of Complicated Tasks 3   Applied Cognition Raw Score 23   Applied Cognition Standardized Score 53.08   Modified [place] Scale   Modified Triston Scale 4   End of Consult   Education Provided Yes   Patient Position at End of Consult Supine;Bed/Chair alarm activated;All needs within reach   Nurse Communication Nurse aware of consult       OT Goals    - Pt will be Supervision with LB ADL by end of hospital course.    - Pt will be Mod I with UB ADL by end of hospital course.    - Pt will be Mod I with all functional transfers required for patient safety by end of hospital course.    - Pt will be Mod I with functional mobility to/from bathroom for increased independence with toileting tasks.    - Pt will independently recall and implement safety precautions during OT sessions.     - Pt activity tolerance will increase to 30 minutes in order to safely engage in ADL and transfers.     - Pt standing tolerance will increase to 5 minutes  in order to promote sink side ADLs and IADL activities.    - Pt will attend to and complete ongoing cognitive assessment in order to inform safe discharge planning.            IVORY Schultz, OTR/L

## 2025-02-16 NOTE — PROGRESS NOTES
Progress Note - Neurosurgery   Name: [facility] 44 y.o. female I MRN: [id number]  [facility] Date of Admission: 2/13/2025   Date of Service: 2/16/2025 I Hospital Day: 3    Assessment & Plan  CSF leak  POD 3 endonasal CSF leak repair and placement of lumbar drain (Sivan / Maximino, 2/13/25)    Plan:  Continue to monitor neurological exam  LD in place, this was clamped yesterday.  Per discussion with ENT okay to remove today.  Lidocaine injected locally and drain removed without difficulty.  1 suture tied down in place  Patient to remain flat for 2 hours and then can slowly increase head of bed every hour by 15 degrees  Continue to monitor site for any drainage  Abx per ENT service  Rest of care per ICU team / ENT  Ok to mobilize with PT/OT  DVT ppx: SCDs, cleared from neurosurgical standpoint to start DVT prophylaxis tomorrow morning  Spoke with both patient and ENT resident, plan to remove lumbar suture at ENT follow-up    Neurosurgery will sign off, no neurosurgical follow-up needed.  Recommend follow-up with ENT, call with any further question or concerns.        Subjective Patient continues to complain of headaches.  Spoke with ICU team as well as ENT.  Lumbar drain removed.      Objective : Patient comfortably laying in bed, NAD    Temp:  [97.7 °F (36.5 °C)-98.5 °F (36.9 °C)] 98.5 °F (36.9 °C)  HR:  [68-87] 87  BP: (119-136)/(66-77) 129/77  Resp:  [17-21] 18  SpO2:  [95 %-98 %] 98 %  O2 Device: None (Room air)    I/O         02/14 0701  02/15 0700 02/15 0701  02/16 0700 02/16 0701  02/17 0700    P.O.       I.V. (mL/kg) 2200 (29.9)      IV Piggyback 1496 1178.1     Total Intake(mL/kg) 3696 (50.3) 1178.1 (16)     Urine (mL/kg/hr) 1900 (1.1) 0 (0)     Emesis/NG output 0 1     Drains 159 5     Stool 0 0     Total Output 2059 6     Net +1637 +1172.1            Unmeasured Urine Occurrence 3 x 3 x     Unmeasured Stool Occurrence 3 x 2 x     Unmeasured Emesis Occurrence 1 x 1 x             General  appearance: alert, appears stated age, cooperative and no distress  Head: Normocephalic, without obvious abnormality, atraumatic  Eyes: EOMI, PERRL, conjugate gaze  Neck: supple, symmetrical, trachea midline   Back: Lumbar drain in place.  No drainage around drain site.  Lumbar drain removed without difficulty 1 suture tied down in place.  Continue to monitor for drainage  Lungs: non labored breathing  Heart: regular heart rate  Neurologic:   Mental status: Alert, oriented x3, thought content appropriate, speech is clear, following commands  Cranial nerves: grossly intact (Cranial nerves II-XII)  Sensory: normal to light touch all extremities x 4  Motor: moving all extremities without focal weakness     Lab Results: I have reviewed the following results:  Recent Labs     02/15/25  0442 02/16/25  0628   WBC 7.84 6.68   HGB 11.6 12.4   HCT 35.3 37.0    220   SODIUM 139 137   K 3.7 3.8   * 115*   CO2 15* 14*   BUN 9 11   CREATININE 0.71 0.72   GLUC 112 88   CAIONIZED 1.11* 1.20   MG 2.8* 2.1   PHOS 1.4* 2.0*   AST 14  --    ALT 31  --    ALB 3.7  --    TBILI 0.38  --    ALKPHOS 39  --          VTE Pharmacologic Prophylaxis: Sequential compression device (Venodyne)

## 2025-02-17 VITALS
WEIGHT: 162 LBS | OXYGEN SATURATION: 97 % | DIASTOLIC BLOOD PRESSURE: 81 MMHG | BODY MASS INDEX: 29.81 KG/M2 | RESPIRATION RATE: 18 BRPM | TEMPERATURE: 97.8 F | HEIGHT: 62 IN | SYSTOLIC BLOOD PRESSURE: 131 MMHG | HEART RATE: 88 BPM

## 2025-02-17 LAB
ANION GAP SERPL CALCULATED.3IONS-SCNC: 7 MMOL/L (ref 4–13)
BUN SERPL-MCNC: 19 MG/DL (ref 5–25)
CALCIUM SERPL-MCNC: 9.1 MG/DL (ref 8.4–10.2)
CHLORIDE SERPL-SCNC: 114 MMOL/L (ref 96–108)
CO2 SERPL-SCNC: 16 MMOL/L (ref 21–32)
CREAT SERPL-MCNC: 0.72 MG/DL (ref 0.6–1.3)
ERYTHROCYTE [DISTWIDTH] IN BLOOD BY AUTOMATED COUNT: 13 % (ref 11.6–15.1)
GFR SERPL CREATININE-BSD FRML MDRD: 102 ML/MIN/1.73SQ M
GLUCOSE SERPL-MCNC: 99 MG/DL (ref 65–140)
HCT VFR BLD AUTO: 38 % (ref 34.8–46.1)
HGB BLD-MCNC: 13 G/DL (ref 11.5–15.4)
MCH RBC QN AUTO: 28.7 PG (ref 26.8–34.3)
MCHC RBC AUTO-ENTMCNC: 34.2 G/DL (ref 31.4–37.4)
MCV RBC AUTO: 84 FL (ref 82–98)
PLATELET # BLD AUTO: 263 THOUSANDS/UL (ref 149–390)
PMV BLD AUTO: 9.6 FL (ref 8.9–12.7)
POTASSIUM SERPL-SCNC: 4 MMOL/L (ref 3.5–5.3)
RBC # BLD AUTO: 4.53 MILLION/UL (ref 3.81–5.12)
SODIUM SERPL-SCNC: 137 MMOL/L (ref 135–147)
WBC # BLD AUTO: 7.06 THOUSAND/UL (ref 4.31–10.16)

## 2025-02-17 PROCEDURE — 80048 BASIC METABOLIC PNL TOTAL CA: CPT

## 2025-02-17 PROCEDURE — 85027 COMPLETE CBC AUTOMATED: CPT

## 2025-02-17 RX ORDER — ACETAMINOPHEN 325 MG/1
650 TABLET ORAL ONCE
Status: COMPLETED | OUTPATIENT
Start: 2025-02-17 | End: 2025-02-17

## 2025-02-17 RX ORDER — CEFUROXIME AXETIL 500 MG/1
500 TABLET ORAL EVERY 12 HOURS SCHEDULED
Qty: 13 TABLET | Refills: 0 | Status: SHIPPED | OUTPATIENT
Start: 2025-02-17 | End: 2025-02-24

## 2025-02-17 RX ORDER — CAFFEINE 200 MG
400 TABLET ORAL ONCE
Status: COMPLETED | OUTPATIENT
Start: 2025-02-17 | End: 2025-02-17

## 2025-02-17 RX ADMIN — METHOCARBAMOL 500 MG: 500 TABLET ORAL at 05:34

## 2025-02-17 RX ADMIN — CEFUROXIME AXETIL 500 MG: 250 TABLET, FILM COATED ORAL at 08:24

## 2025-02-17 RX ADMIN — ACETAMINOPHEN 650 MG: 325 TABLET, FILM COATED ORAL at 08:44

## 2025-02-17 RX ADMIN — CAFFEINE 400 MG: 200 TABLET ORAL at 09:58

## 2025-02-17 NOTE — NURSING NOTE
DC instructions reviewed with pt. IV removed. Left unit with  and nursing student to p/u medication at [facility] pharmacy, then Dc'd to home.

## 2025-02-17 NOTE — PROGRESS NOTES
"Otolaryngology HN/FPRS Progress Note:    Subjective: Pt POD #4 s/p endonasal repair CSF leak. Doing well. No acute events overnight. No rhinorrhea, no salty/metallic taste in mouth. Increased positional neck pain when head is elevated. Baseline headache still present    LD removed on 2/16. Patient reports of headache upon coughing.     Objective:   /58   Pulse 81   Temp 97.9 °F (36.6 °C)   Resp 18   Ht 5' 2\" (1.575 m)   Wt 73.5 kg (162 lb)   SpO2 97%   BMI 29.63 kg/m²     Physical Exam   Gen: NAD.  HEENT: no masses or lesions, no clear drainage from nose or posterior pharyngeal wall  Lungs: Breathing easily. No stertor or stridor  CV: RRR. Good distal perfusion  Neck: Soft and flat    Assessment/Plan: POD #4 s/p endonasal CSF leak repair - Doing well.     - No acute ENT interventions, plan for discharge to home today  - OOB to chair and walking once lumbar drain is removed  - Diamox BID held dur to low pressure headaches  - Ceftin 500 mg BID  - LD drain removed on 2/16, hold DVT ppx until 2/17 AM  - Sinus precautions: no nose blowing, no straws, elevate HOB, sneeze with mouth open  - Call ENT with any questions or concerns      Jose C Queen BDS, DMD, MPA, MD   PGY-1  Otorhinolaryngology - Head & Neck Surgery  Please contact ENT Resident Epic Secure Chat Role for any questions or concerns.    ** Please Note: Portions of the record may have been created with voice recognition software. Occasional wrong word or \"sound a like\" substitutions may have occurred due to the inherent limitations of voice recognition software. There may also be notations and random deletions of words or characters from malfunctioning software. Read the chart carefully and recognize, using context, where substitutions/deletions have occurred.**  "

## 2025-02-17 NOTE — OP NOTE
OPERATIVE REPORT  PATIENT NAME: Lynda Shaffer    :  1980  MRN: [id number]  Pt Location: [facility] OR ROOM 03    SURGERY DATE: 2025    Surgeons and Role:  Panel 1:     * Chele St MD - Primary     * Julia Hunt MD - Fellow  Panel 2:     * Brenda Stanton MD - Primary    Preop Diagnosis:  CSF leak [G96.00]    Post-Op Diagnosis Codes:     * CSF leak [G96.00]    Procedure(s):   Endoscopic repair of left sphenoid sinus CSF leak   Septoplasty, harvest of cartilage graft   Abdominal fat graft   Use of intraoperative navigation      Estimated Blood Loss:   Less than 75 mL    Drains:  [REMOVED] Lumbar Drain (Removed)   Drain Status Clamped 25   Drain Level (cm) 0 cm 25   CSF Color Serous 02/15/25 0008   Site Description Unable to view 25   Dressing Status Clean;Dry;Intact 25   Output (mL) 5 mL 02/15/25 1400   Number of days: 3       Anesthesia Type:   General    Operative Indications:  CSF leak [G96.00]    Operative Findings:  Patient with left sphenoid sinus CSF leak.  The leak appeared to in the area of the anterior inferior part of the sella and appeared more like trabeculated bone that the CSF was coming through.  This was repaired with fat graft, cartilage graft, and free mucosal graft.      Complications:   None    Procedure and Technique:  After the patient was does any remaining questions in the preoperative area, the patient was escorted to the operating suite by both ENT and anesthesia.  There, surgical timeout was performed to ensure the proper patient and procedure.  Once this was done, general endotracheal anesthesia was induced without incident.  Once given go-ahead by anesthesia, neurosurgery started their portion of the procedure.  They placed a lumbar drain.  Please see their operative note which is dictated separately.  After this was completed, the patient was turned 90 degrees.  The [employer] navigation device was applied to the  patient's forehead and registered without incident.  Afrin-soaked pledgets were placed bilaterally.  The patient was then prepped and draped in normal fashion for the above procedures.    After time was given, the pledgets were removed.  The nose was examined with a 30 degree endoscope.  On the left side, the middle turbinate and the superior turbinate were lateralized.  1%  lidocaine with epinephrine 1-100,000 was injected into the nasal septum area as well as the medial portions of the superior turbinate and middle turbinate.  Epinephrine soaked pledgets were then placed in this area for additional hemostasis and decongestion.    After time was given, the pledgets were removed.  When reexamined after the decongestion, the sphenoid ostium was identified.  The sphenoid ostium was then widely opened up using Kerrison forceps as well as the [employer]izer[employer]ru microdebrider.  A 15 degree alonzo bur drill was also used to widen the ostium so that there is enough room to work and to identify the CSF leak.  Endoscopic scissors was then also used to resect the most inferior portion of the middle turbinate to gain more room.  A mucosal graft was also harvested from this removed portion of the inferior turbinate.  This was placed onto the back table and kept hydrated.  After the sphenoidotomy was completed, the sphenoid sinus itself was inspected thoroughly.  There appeared to be a mucosal bleb on the anterior/lateral portion of the sella area.  I did remove this with a Blakesley forcep.  After this was removed, the site of the leak was identified.  The mucosa was removed all around this bone, and it did not appear that it was just 1 hold.  It appeared almost like the bone was trabeculated and the leak was coming from this trabeculated area of bone.  Because of this, decision was made to remove this portion of the bone to create a more formal hold that could be prepared with multiple layers.  Using the 15 degree alonzo bur drill,  this was carefully drilled down.  Once I needed the smaller drill, the Midas Bernardo drill was also used.  Once this was drilled down, I was able to eggshell the bone and remove it with using Kerrison forceps.  There was some bleeding encountered which was controlled with Gelfoam with thrombin as well as judicious use of Calvian Endo-pen bipolar.  Attention was then turned to harvesting the cartilage graft.  A 15 blade was used to make a left-sided hemitransfixion incision.  A cartilage knife was then used to make a cut neurotomy.  A similar flap was made on the right side.  A cartilage knife was then used to make a rectangular cut in the cartilage that was used for graft.    Attention was then turned over to the abdominal fat graft.  Using new gloves and new instruments, the area around the umbilicus was exposed.  Curvilinear incision just inferior to the umbilicus was performed.  Using blunt and sharp dissection, a fat graft was harvested without issue.  The incision was then closed with 4-0 Monocryl suture as well as Dermabond.  Both grafts were placed onto the back table for further use.  Attention was then turned to the repair.  Abdominal fat graft was used to placed into the defect.  A cartilage graft was then placed as an inlay graft using J curette as well as a ball-tipped probe.  Finally, the mucosal graft was placed over this area.  DuraSeal spray air was then used to spray DuraSeal as the tissue sealant to hold everything in place.  Avitene slurry was then placed over this area.  Finally the Gelfoam's were placed over the Avitene as a breakaway layer.  A Merocel was then placed into the sphenoid sinus to hold pressure.  The hemitransfixion incision was closed with chromic gut suture.  All bleeding was controlled with epinephrine soaked pledgets.  An OG tube was used to suction out the patient's stomach contents.  The patient was then turned over the anesthesia allowed to awaken without incident.   I was  present for the entire procedure.    Patient Disposition:  PACU              SIGNATURE: Chele St MD  DATE: February 17, 2025  TIME: 10:56 AM

## 2025-02-17 NOTE — DISCHARGE SUMMARY
Otolaryngology Discharge Summary    [facility] 44 y.o. female MRN: [id number]    Unit/Bed#: [facility] 733-01 Encounter: [id number]    Admission Date: 2/13/2025     Discharge Date:/2/17/2025    Attending: Chele St MD                       Operative Surgeon: MD Brenda Hamm MD     Consultants: Neurosurgery for lumbar drain placement in setting of CSF leak.     Admitting Diagnosis: CSF leak [G96.00]    Principle Diagnosis: CSF Leak    Secondary Diagnosis:  Past Medical History:   Diagnosis Date    Allergic     Anxiety     GERD (gastroesophageal reflux disease)     Hypertension     Ovarian cyst     PONV (postoperative nausea and vomiting)     Sinus headache     probably from elevated BP    Sleep disorder      Past Surgical History:   Procedure Laterality Date    ANKLE SURGERY Right     1998, 1999    LIPOSUCTION W/ FAT INJECTION Bilateral 2/13/2025    Procedure: ABDOMINAL FAT GAFT;  Surgeon: Chele St MD;  Location: [facility];  Service: ENT    MOLE REMOVAL      from neck; benign    OOPHORECTOMY Right     OVARIAN CYST REMOVAL      IL NASAL/SINUS NDSC RPR CEREBSP FLUID LEAK SPHENOID N/A 2/13/2025    Procedure: FUNCTIONAL ENDOSCOPIC SINUS SURGERY (FESS) IMAGED GUIDED SPHENOID SINUS,;  Surgeon: Chele St MD;  Location: [facility];  Service: ENT    IL SECONDARY RPR DURA CSF LEAK FREE TISSUE GRAFT N/A 2/13/2025    Procedure: REPAIR CSF LEAK TRANSNASAL;  Surgeon: Chele St MD;  Location[facility];  Service: ENT    IL SEPTOPLASTY/SUBMUCOUS RESECJ W/WO CARTILAGE GRF N/A 2/13/2025    Procedure: SEPTOPLASTY;  Surgeon: Chele St MD;  Location: [facility];  Service: ENT    TUBAL LIGATION      WISDOM TOOTH EXTRACTION  2012    x2    WRIST SURGERY Right 2013        Procedures Performed:  Functional endoscopic sinus surgery  Repair of CSF leak  Septoplasty  Abdominal fat graft  Lumbar drain placement      Discharge  Medications:  See after visit summary for reconciled discharge medications provided to patient and family.      Brief HPI (per H/P):     Hospital Course: Lynda Shaffer is a 44 y.o. female who presented 2/13/2025 per HPI and was taken to the OR for above procedures.    The patient hospital course was uncomplicated.    Patient was discharged on POD 4 s/p above procedures. Overnight, there were no desaturations. On the day of discharge, the patient had good PO intake and pain was well controlled with tylenol and motrin. The patient and family understood all instructions for discharge. The patient was also given the names and numbers of the providers as well as instructions for follow up appointments.     Condition at Discharge: Good    Provisions for Follow-Up Care:  See after visit summary for information related to follow-up care and any pertinent home health orders.      Disposition: Home/ Self-Care    Discharge instructions/Information to patient and family:   See after visit summary for information provided to patient and family.    Planned Readmission: No    Discharge Statement   I spent 20 minutes discharging the patient. This time was spent on the day of discharge. I had direct contact with the patient on the day of discharge. Additional documentation is required if more than 30 minutes were spent on discharge.

## 2025-02-18 ENCOUNTER — TRANSITIONAL CARE MANAGEMENT (OUTPATIENT)
Dept: FAMILY MEDICINE CLINIC | Facility: CLINIC | Age: 45
End: 2025-02-18

## 2025-02-18 LAB
ATRIAL RATE: 82 BPM
ATRIAL RATE: 82 BPM
P AXIS: 43 DEGREES
P AXIS: 46 DEGREES
PR INTERVAL: 179 MS
PR INTERVAL: 188 MS
QRS AXIS: -50 DEGREES
QRS AXIS: -54 DEGREES
QRSD INTERVAL: 83 MS
QRSD INTERVAL: 88 MS
QT INTERVAL: 388 MS
QT INTERVAL: 396 MS
QTC INTERVAL: 454 MS
QTC INTERVAL: 463 MS
T WAVE AXIS: -3 DEGREES
T WAVE AXIS: 3 DEGREES
VENTRICULAR RATE: 82 BPM
VENTRICULAR RATE: 82 BPM

## 2025-02-18 PROCEDURE — 93010 ELECTROCARDIOGRAM REPORT: CPT | Performed by: INTERNAL MEDICINE

## 2025-02-18 NOTE — UTILIZATION REVIEW
NOTIFICATION OF ADMISSION DISCHARGE   This is a Notification of Discharge from [facility]. Please be advised that this patient has been discharge from our facility. Below you will find the admission and discharge date and time including the patient’s disposition.   UTILIZATION REVIEW CONTACT:  Peter Hart  Utilization   Network Utilization Review Department  Phone: [phone] x carefully listen to the prompts. All voicemails are confidential.  Email: [email or web address]     ADMISSION INFORMATION  PRESENTATION DATE: 2/13/2025  6:15 AM  OBERVATION ADMISSION DATE: N/A  INPATIENT ADMISSION DATE: 2/13/25  7:11 AM   DISCHARGE DATE: 2/17/2025 10:11 AM   DISPOSITION:Home/Self Care    Network Utilization Review Department  ATTENTION: Please call with any questions or concerns to [phone] and carefully listen to the prompts so that you are directed to the right person. All voicemails are confidential.   For Discharge needs, contact Care Management DC Support Team at [phone] opt. 2  Send all requests for admission clinical reviews, approved or denied determinations and any other requests to dedicated fax number below belonging to the campus where the patient is receiving treatment. List of dedicated fax numbers for the Facilities:  FACILITY NAME UR FAX NUMBER   ADMISSION DENIALS (Administrative/Medical Necessity) [phone]   DISCHARGE SUPPORT TEAM ([facility]) [phone]   PARENT CHILD HEALTH (Maternity/NICU/Pediatrics) [phone]   [facility] [phone]   [facility] [phone]   [facility] [phone]   [facility] [phone]   [facility] [phone]   [facility] [phone]   [facility] [phone]   [facility] [phone]   [facility]  [facility] [phone]   [facility] [phone]   [facility] [phone]   [facility] [phone]

## 2025-03-20 DIAGNOSIS — I10 ESSENTIAL (PRIMARY) HYPERTENSION: ICD-10-CM

## 2025-03-21 RX ORDER — LISINOPRIL 20 MG/1
20 TABLET ORAL DAILY
Qty: 90 TABLET | Refills: 1 | Status: SHIPPED | OUTPATIENT
Start: 2025-03-21

## 2025-05-12 ENCOUNTER — OFFICE VISIT (OUTPATIENT)
Dept: FAMILY MEDICINE CLINIC | Facility: CLINIC | Age: 45
End: 2025-05-12
Payer: COMMERCIAL

## 2025-05-12 VITALS
DIASTOLIC BLOOD PRESSURE: 84 MMHG | SYSTOLIC BLOOD PRESSURE: 128 MMHG | HEART RATE: 81 BPM | TEMPERATURE: 97.5 F | WEIGHT: 165 LBS | HEIGHT: 62 IN | OXYGEN SATURATION: 98 % | BODY MASS INDEX: 30.36 KG/M2

## 2025-05-12 DIAGNOSIS — Z13.220 SCREENING CHOLESTEROL LEVEL: ICD-10-CM

## 2025-05-12 DIAGNOSIS — G96.00 CSF LEAK: Primary | ICD-10-CM

## 2025-05-12 DIAGNOSIS — L71.9 ROSACEA: ICD-10-CM

## 2025-05-12 DIAGNOSIS — I10 PRIMARY HYPERTENSION: ICD-10-CM

## 2025-05-12 PROCEDURE — 99214 OFFICE O/P EST MOD 30 MIN: CPT | Performed by: FAMILY MEDICINE

## 2025-05-12 RX ORDER — MEFENAMIC ACID 250 MG/1
CAPSULE ORAL
COMMUNITY
End: 2025-05-12

## 2025-05-12 NOTE — PROGRESS NOTES
Name: Lynda Shaffer      : 1980      MRN: [id number]  Encounter Provider: Jean Joyce MD  Encounter Date: 2025   Encounter department: [facility]  :  Assessment & Plan  CSF leak  Patient is stable  and will continue present plan of care and reassess at next routine visit. All questions about this problem from patient were answered today.        Primary hypertension  Patient is stable with current anti-hypertensive medicine and continue to follow a low sodium diet and take current medication. All questions about this condition were answered today.   Orders:  •  Comprehensive metabolic panel; Future  •  CBC and differential; Future  •  TSH, 3rd generation with Free T4 reflex; Future  •  Magnesium; Future  •  Uric acid; Future  •  UA/M w/rflx Culture, Comp    Rosacea  Patient is stable  and will continue present plan of care and reassess at next routine visit. All questions about this problem from patient were answered today.        Screening cholesterol level    Orders:  •  Lipid Panel with Direct LDL reflex; Future          Depression Screening and Follow-up Plan: Patient was screened for depression during today's encounter. They screened negative with a PHQ-2 score of 0.      History of Present Illness   44-year-old female today for checkup on multimedical Bosi patient hypertension history of CSF leak that is now been corrected and she is doing well with her blood pressure.  Patient not any refills and will call when she needs a refill.  Will see the patient back in approximately 6 months for yearly lab work I have ordered that for her today.  She is any troubles in the meantime we will see her back sooner.    Hypertension  This is a recurrent problem. The current episode started more than 1 year ago. The problem is unchanged. The problem is controlled. Pertinent negatives include no anxiety, blurred vision, chest pain, headaches, malaise/fatigue, neck pain, orthopnea,  "palpitations, peripheral edema, PND, shortness of breath or sweats. There are no associated agents to hypertension. There are no known risk factors for coronary artery disease. There are no compliance problems.      Review of Systems   Constitutional:  Negative for activity change, appetite change, fatigue, fever and malaise/fatigue.   HENT:  Negative for congestion, ear pain, postnasal drip, rhinorrhea, sinus pressure, sinus pain, sneezing and sore throat.    Eyes:  Negative for blurred vision, pain and redness.   Respiratory:  Negative for apnea, cough, chest tightness, shortness of breath and wheezing.    Cardiovascular:  Negative for chest pain, palpitations, orthopnea, leg swelling and PND.   Gastrointestinal:  Negative for abdominal pain, constipation, diarrhea, nausea and vomiting.   Endocrine: Negative for cold intolerance and heat intolerance.   Genitourinary:  Negative for difficulty urinating, dysuria, frequency, hematuria and urgency.   Musculoskeletal:  Negative for arthralgias, back pain, gait problem, myalgias and neck pain.   Skin:  Negative for rash.   Neurological:  Negative for dizziness, speech difficulty, weakness, numbness and headaches.   Hematological:  Does not bruise/bleed easily.   Psychiatric/Behavioral:  Negative for agitation, confusion and hallucinations.        Objective   /84 (BP Location: Left arm, Patient Position: Sitting, Cuff Size: Large)   Pulse 81   Temp 97.5 °F (36.4 °C) (Skin)   Ht 5' 2\" (1.575 m)   Wt 74.8 kg (165 lb)   SpO2 98%   BMI 30.18 kg/m²      Physical Exam  Constitutional:       Appearance: Normal appearance. She is not ill-appearing.   HENT:      Head: Normocephalic and atraumatic.      Right Ear: Tympanic membrane normal.      Left Ear: Tympanic membrane normal.      Nose: Nose normal.      Mouth/Throat:      Mouth: Mucous membranes are moist.   Eyes:      Extraocular Movements: Extraocular movements intact.      Conjunctiva/sclera: Conjunctivae " normal.      Pupils: Pupils are equal, round, and reactive to light.   Cardiovascular:      Rate and Rhythm: Normal rate and regular rhythm.   Pulmonary:      Effort: Pulmonary effort is normal. No respiratory distress.      Breath sounds: Normal breath sounds. No wheezing.   Abdominal:      General: Bowel sounds are normal.      Palpations: Abdomen is soft.      Tenderness: There is no abdominal tenderness.   Musculoskeletal:         General: No tenderness. Normal range of motion.      Cervical back: Normal range of motion and neck supple.      Right lower leg: No edema.      Left lower leg: No edema.   Skin:     General: Skin is warm and dry.   Neurological:      Mental Status: She is alert and oriented to person, place, and time.   Psychiatric:         Mood and Affect: Mood normal.         Behavior: Behavior normal.         Thought Content: Thought content normal.         Judgment: Judgment normal.

## 2025-07-29 ENCOUNTER — TELEPHONE (OUTPATIENT)
Dept: FAMILY MEDICINE CLINIC | Facility: CLINIC | Age: 45
End: 2025-07-29

## (undated) DEVICE — GLOVE SRG BIOGEL 7.5

## (undated) DEVICE — NEEDLE SPINAL 22G X 3.5IN  QUINCKE

## (undated) DEVICE — MAYO STAND COVER: Brand: CONVERTORS

## (undated) DEVICE — STOPCOCK MANOMETER CARDINAL

## (undated) DEVICE — GAUZE SPONGES,16 PLY: Brand: CURITY

## (undated) DEVICE — INSTRUMENT TRACKER 9733533XOM ENT 1PK

## (undated) DEVICE — 3000CC GUARDIAN II: Brand: GUARDIAN

## (undated) DEVICE — BIPOLAR FORCEPS CORD: Brand: VALLEYLAB

## (undated) DEVICE — AVITENE FLOUR, 1.0 GRAM: Brand: AVITENE

## (undated) DEVICE — ANTI-FOG SOLUTION WITH FOAM PAD: Brand: DEVON

## (undated) DEVICE — DRAPE SURGIKIT SADDLE BAG

## (undated) DEVICE — DURASEAL MICROMYST APPLICATOR TIP

## (undated) DEVICE — EXOFIN PRECISION PEN HIGH VISCOSITY TOPICAL SKIN ADHESIVE: Brand: EXOFIN PRECISION PEN, 1G

## (undated) DEVICE — SHEATH 1912010 5PK 4MM/30DEG STORZ XOMED: Brand: ENDO-SCRUB®

## (undated) DEVICE — ALL PURPOSE SPONGES,NON-WOVEN, 4 PLY: Brand: CURITY

## (undated) DEVICE — PACKING 400422 20PK PR KENNEDY SINUS-PAK: Brand: MEROCEL®

## (undated) DEVICE — SURGIFLO ENDOSCOPIC APPICATOR: Brand: ETHICON

## (undated) DEVICE — PATIENT TRACKER 9734887XOM NON-INVASIVE

## (undated) DEVICE — SPLINT 1524050 5PK PAIR DOYLE II AIRWAY: Brand: DOYLE II ™

## (undated) DEVICE — MONITORR™ ICP EXTERNAL CSF DRAINAGE AND MONITORING SYSTEM: Brand: MONITORR™

## (undated) DEVICE — TUBING 1895522 5PK STRAIGHTSHOT TO XPS: Brand: STRAIGHTSHOT®

## (undated) DEVICE — PAD GROUNDING DUAL ADULT

## (undated) DEVICE — 3M™ TEGADERM™ TRANSPARENT FILM DRESSING FRAME STYLE, 1628, 6 IN X 8 IN (15 CM X 20 CM), 10/CT 8CT/CASE: Brand: 3M™ TEGADERM™

## (undated) DEVICE — TUBING 1912030 ENDO-SCRUB 2 5PK: Brand: ENDO-SCRUB®

## (undated) DEVICE — HERMETIC™ LUMBAR CATHETER CLOSED TIP: Brand: HERMETIC™

## (undated) DEVICE — SUT CHROMIC 4-0 RB-1 27 IN U203H

## (undated) DEVICE — STERILE NASAL PACK: Brand: CARDINAL HEALTH

## (undated) DEVICE — NEURO PATTIES 1/2 X 3

## (undated) DEVICE — BUR 1885076HSE 15CM X 5MM 15DEG DIAMOND

## (undated) DEVICE — DURASEAL 5ML

## (undated) DEVICE — GELFOAM 100

## (undated) DEVICE — TOOL 15MH22 LEGEND 15CM 2.2MM MH: Brand: MIDAS REX ™